# Patient Record
Sex: MALE | Race: WHITE | NOT HISPANIC OR LATINO | Employment: UNEMPLOYED | URBAN - METROPOLITAN AREA
[De-identification: names, ages, dates, MRNs, and addresses within clinical notes are randomized per-mention and may not be internally consistent; named-entity substitution may affect disease eponyms.]

---

## 2023-04-12 ENCOUNTER — TELEPHONE (OUTPATIENT)
Dept: PRIMARY CARE | Facility: CLINIC | Age: 58
End: 2023-04-12
Payer: MEDICAID

## 2023-04-12 DIAGNOSIS — R06.02 SOB (SHORTNESS OF BREATH) ON EXERTION: ICD-10-CM

## 2023-04-12 DIAGNOSIS — R29.898 WEAKNESS OF BOTH LOWER EXTREMITIES: ICD-10-CM

## 2023-04-12 DIAGNOSIS — I69.30 HISTORY OF STROKE WITH RESIDUAL EFFECTS: ICD-10-CM

## 2023-04-12 DIAGNOSIS — G89.4 CHRONIC PAIN SYNDROME: ICD-10-CM

## 2023-04-12 PROBLEM — M25.512 ACUTE PAIN OF BOTH SHOULDERS: Status: ACTIVE | Noted: 2023-04-12

## 2023-04-12 PROBLEM — M25.511 ACUTE PAIN OF BOTH SHOULDERS: Status: ACTIVE | Noted: 2023-04-12

## 2023-04-12 PROBLEM — R94.31 ABNORMAL ELECTROCARDIOGRAPHY: Status: ACTIVE | Noted: 2023-04-12

## 2023-04-12 PROBLEM — E66.01 MORBID OBESITY (MULTI): Status: ACTIVE | Noted: 2023-04-12

## 2023-04-12 PROBLEM — E78.5 HYPERLIPIDEMIA: Status: ACTIVE | Noted: 2023-04-12

## 2023-04-12 PROBLEM — S39.94XA TESTICULAR INJURY: Status: ACTIVE | Noted: 2023-04-12

## 2023-04-12 PROBLEM — R07.89 ATYPICAL CHEST PAIN: Status: ACTIVE | Noted: 2023-04-12

## 2023-04-12 PROBLEM — E11.65 TYPE 2 DIABETES MELLITUS WITH HYPERGLYCEMIA, WITHOUT LONG-TERM CURRENT USE OF INSULIN (MULTI): Status: ACTIVE | Noted: 2023-04-12

## 2023-04-12 PROBLEM — R09.89 CAROTID ARTERY BRUIT: Status: ACTIVE | Noted: 2023-04-12

## 2023-04-12 PROBLEM — G50.0 TRIGEMINAL NEURALGIA OF RIGHT SIDE OF FACE: Status: ACTIVE | Noted: 2023-04-12

## 2023-04-12 PROBLEM — M62.81 WEAKNESS OF TRUNK MUSCULATURE: Status: ACTIVE | Noted: 2023-04-12

## 2023-04-12 PROBLEM — I65.22 STENOSIS OF LEFT CAROTID ARTERY: Status: ACTIVE | Noted: 2023-04-12

## 2023-04-12 PROBLEM — R97.20 ELEVATED PROSTATE SPECIFIC ANTIGEN (PSA): Status: ACTIVE | Noted: 2023-04-12

## 2023-04-12 PROBLEM — N40.0 BPH (BENIGN PROSTATIC HYPERPLASIA): Status: ACTIVE | Noted: 2023-04-12

## 2023-04-12 PROBLEM — K21.9 GASTROESOPHAGEAL REFLUX DISEASE WITHOUT ESOPHAGITIS: Status: ACTIVE | Noted: 2023-04-12

## 2023-04-12 PROBLEM — D16.4 OSTEOMA OF SKULL: Status: ACTIVE | Noted: 2023-04-12

## 2023-04-12 PROBLEM — G89.29 INSOMNIA SECONDARY TO CHRONIC PAIN: Status: ACTIVE | Noted: 2023-04-12

## 2023-04-12 PROBLEM — H66.92 ACUTE LEFT OTITIS MEDIA: Status: ACTIVE | Noted: 2023-04-12

## 2023-04-12 PROBLEM — I73.9 PERIPHERAL VASCULAR DISEASE (CMS-HCC): Status: ACTIVE | Noted: 2023-04-12

## 2023-04-12 PROBLEM — I48.0 PAROXYSMAL ATRIAL FIBRILLATION (MULTI): Status: ACTIVE | Noted: 2023-04-12

## 2023-04-12 PROBLEM — I10 BENIGN ESSENTIAL HYPERTENSION: Status: ACTIVE | Noted: 2023-04-12

## 2023-04-12 PROBLEM — J01.90 ACUTE BACTERIAL SINUSITIS: Status: ACTIVE | Noted: 2023-04-12

## 2023-04-12 PROBLEM — B96.89 ACUTE BACTERIAL SINUSITIS: Status: ACTIVE | Noted: 2023-04-12

## 2023-04-12 PROBLEM — M54.2 ACUTE NECK PAIN: Status: ACTIVE | Noted: 2023-04-12

## 2023-04-12 PROBLEM — D35.00 ADRENAL ADENOMA: Status: ACTIVE | Noted: 2023-04-12

## 2023-04-12 PROBLEM — N35.919 URETHRAL STRICTURE: Status: ACTIVE | Noted: 2023-04-12

## 2023-04-12 PROBLEM — G47.01 INSOMNIA SECONDARY TO CHRONIC PAIN: Status: ACTIVE | Noted: 2023-04-12

## 2023-04-12 PROBLEM — R42 DIZZINESS: Status: ACTIVE | Noted: 2023-04-12

## 2023-04-12 PROBLEM — E55.9 VITAMIN D DEFICIENCY: Status: ACTIVE | Noted: 2023-04-12

## 2023-04-12 PROBLEM — I67.9 CEREBROVASCULAR SMALL VESSEL DISEASE: Status: ACTIVE | Noted: 2023-04-12

## 2023-04-12 RX ORDER — DEXLANSOPRAZOLE 30 MG/1
1 CAPSULE, DELAYED RELEASE ORAL DAILY
COMMUNITY

## 2023-04-12 RX ORDER — METOPROLOL SUCCINATE 25 MG/1
1 TABLET, EXTENDED RELEASE ORAL DAILY
COMMUNITY
Start: 2020-11-20 | End: 2023-07-31 | Stop reason: SINTOL

## 2023-04-12 RX ORDER — ASPIRIN 81 MG/1
1 TABLET ORAL DAILY
COMMUNITY
Start: 2023-02-02

## 2023-04-12 RX ORDER — METAXALONE 800 MG/1
800 TABLET ORAL 3 TIMES DAILY PRN
COMMUNITY
End: 2023-07-31 | Stop reason: ALTCHOICE

## 2023-04-12 RX ORDER — LANCETS 33 GAUGE
EACH MISCELLANEOUS
COMMUNITY
Start: 2023-01-24 | End: 2023-12-20 | Stop reason: SDUPTHER

## 2023-04-12 RX ORDER — ALBUTEROL SULFATE 90 UG/1
AEROSOL, METERED RESPIRATORY (INHALATION)
COMMUNITY
Start: 2023-01-12 | End: 2023-07-31 | Stop reason: ALTCHOICE

## 2023-04-12 RX ORDER — LORATADINE 10 MG/1
1 TABLET ORAL DAILY
COMMUNITY
Start: 2022-06-02

## 2023-04-12 RX ORDER — NIFEDIPINE 30 MG/1
TABLET, FILM COATED, EXTENDED RELEASE ORAL EVERY 12 HOURS
COMMUNITY
Start: 2017-12-15 | End: 2023-07-31 | Stop reason: SDUPTHER

## 2023-04-12 RX ORDER — BISOPROLOL FUMARATE 10 MG/1
1 TABLET, FILM COATED ORAL DAILY
COMMUNITY
Start: 2021-03-22 | End: 2023-10-23 | Stop reason: SDUPTHER

## 2023-04-12 RX ORDER — BLOOD SUGAR DIAGNOSTIC
1 STRIP MISCELLANEOUS 2 TIMES DAILY
COMMUNITY
Start: 2022-05-02 | End: 2024-02-07 | Stop reason: SDUPTHER

## 2023-04-12 RX ORDER — CHOLECALCIFEROL (VITAMIN D3) 125 MCG
CAPSULE ORAL
COMMUNITY
Start: 2018-02-02

## 2023-04-20 ENCOUNTER — TELEPHONE (OUTPATIENT)
Dept: PRIMARY CARE | Facility: CLINIC | Age: 58
End: 2023-04-20

## 2023-04-20 DIAGNOSIS — R21 RASH: Primary | ICD-10-CM

## 2023-04-21 RX ORDER — ISOPROPYL ALCOHOL 70 ML/100ML
SWAB TOPICAL
Qty: 100 EACH | Refills: 3 | Status: SHIPPED | OUTPATIENT
Start: 2023-04-21 | End: 2024-02-07 | Stop reason: SDUPTHER

## 2023-04-28 ENCOUNTER — LAB (OUTPATIENT)
Dept: LAB | Facility: LAB | Age: 58
End: 2023-04-28
Payer: MEDICAID

## 2023-04-28 ENCOUNTER — OFFICE VISIT (OUTPATIENT)
Dept: PRIMARY CARE | Facility: CLINIC | Age: 58
End: 2023-04-28
Payer: MEDICAID

## 2023-04-28 VITALS
DIASTOLIC BLOOD PRESSURE: 80 MMHG | HEART RATE: 109 BPM | WEIGHT: 253 LBS | BODY MASS INDEX: 35.42 KG/M2 | HEIGHT: 71 IN | SYSTOLIC BLOOD PRESSURE: 110 MMHG

## 2023-04-28 DIAGNOSIS — I73.9 PERIPHERAL VASCULAR DISEASE (CMS-HCC): ICD-10-CM

## 2023-04-28 DIAGNOSIS — R97.20 ELEVATED PROSTATE SPECIFIC ANTIGEN (PSA): ICD-10-CM

## 2023-04-28 DIAGNOSIS — H10.12 ALLERGIC CONJUNCTIVITIS AND RHINITIS, LEFT: ICD-10-CM

## 2023-04-28 DIAGNOSIS — I65.22 STENOSIS OF LEFT CAROTID ARTERY: ICD-10-CM

## 2023-04-28 DIAGNOSIS — I48.0 PAROXYSMAL ATRIAL FIBRILLATION (MULTI): Primary | ICD-10-CM

## 2023-04-28 DIAGNOSIS — E11.65 TYPE 2 DIABETES MELLITUS WITH HYPERGLYCEMIA, WITHOUT LONG-TERM CURRENT USE OF INSULIN (MULTI): ICD-10-CM

## 2023-04-28 DIAGNOSIS — E78.2 MIXED HYPERLIPIDEMIA: ICD-10-CM

## 2023-04-28 DIAGNOSIS — K21.9 GASTROESOPHAGEAL REFLUX DISEASE WITHOUT ESOPHAGITIS: ICD-10-CM

## 2023-04-28 DIAGNOSIS — L21.9 SEBORRHEIC DERMATITIS OF SCALP: ICD-10-CM

## 2023-04-28 DIAGNOSIS — I10 BENIGN ESSENTIAL HYPERTENSION: Primary | ICD-10-CM

## 2023-04-28 DIAGNOSIS — J30.9 ALLERGIC CONJUNCTIVITIS AND RHINITIS, LEFT: ICD-10-CM

## 2023-04-28 DIAGNOSIS — D35.01 ADENOMA OF RIGHT ADRENAL GLAND: ICD-10-CM

## 2023-04-28 DIAGNOSIS — E55.9 VITAMIN D DEFICIENCY: ICD-10-CM

## 2023-04-28 DIAGNOSIS — E66.01 CLASS 2 SEVERE OBESITY DUE TO EXCESS CALORIES WITH SERIOUS COMORBIDITY AND BODY MASS INDEX (BMI) OF 35.0 TO 35.9 IN ADULT (MULTI): ICD-10-CM

## 2023-04-28 DIAGNOSIS — I48.0 PAROXYSMAL ATRIAL FIBRILLATION (MULTI): ICD-10-CM

## 2023-04-28 PROBLEM — M25.512 ACUTE PAIN OF BOTH SHOULDERS: Status: RESOLVED | Noted: 2023-04-12 | Resolved: 2023-04-28

## 2023-04-28 PROBLEM — G89.4 CHRONIC PAIN SYNDROME: Status: RESOLVED | Noted: 2023-04-12 | Resolved: 2023-04-28

## 2023-04-28 PROBLEM — B96.89 ACUTE BACTERIAL SINUSITIS: Status: RESOLVED | Noted: 2023-04-12 | Resolved: 2023-04-28

## 2023-04-28 PROBLEM — R06.02 SOB (SHORTNESS OF BREATH) ON EXERTION: Status: RESOLVED | Noted: 2023-04-12 | Resolved: 2023-04-28

## 2023-04-28 PROBLEM — H66.92 ACUTE LEFT OTITIS MEDIA: Status: RESOLVED | Noted: 2023-04-12 | Resolved: 2023-04-28

## 2023-04-28 PROBLEM — G89.29 INSOMNIA SECONDARY TO CHRONIC PAIN: Status: RESOLVED | Noted: 2023-04-12 | Resolved: 2023-04-28

## 2023-04-28 PROBLEM — J01.90 ACUTE BACTERIAL SINUSITIS: Status: RESOLVED | Noted: 2023-04-12 | Resolved: 2023-04-28

## 2023-04-28 PROBLEM — R29.898 WEAKNESS OF BOTH LOWER EXTREMITIES: Status: RESOLVED | Noted: 2023-04-12 | Resolved: 2023-04-28

## 2023-04-28 PROBLEM — R07.89 ATYPICAL CHEST PAIN: Status: RESOLVED | Noted: 2023-04-12 | Resolved: 2023-04-28

## 2023-04-28 PROBLEM — E66.812 CLASS 2 SEVERE OBESITY DUE TO EXCESS CALORIES WITH SERIOUS COMORBIDITY AND BODY MASS INDEX (BMI) OF 35.0 TO 35.9 IN ADULT: Status: ACTIVE | Noted: 2023-04-12

## 2023-04-28 PROBLEM — G50.0 TRIGEMINAL NEURALGIA OF RIGHT SIDE OF FACE: Status: RESOLVED | Noted: 2023-04-12 | Resolved: 2023-04-28

## 2023-04-28 PROBLEM — R94.31 ABNORMAL ELECTROCARDIOGRAPHY: Status: RESOLVED | Noted: 2023-04-12 | Resolved: 2023-04-28

## 2023-04-28 PROBLEM — J01.01 ACUTE RECURRENT MAXILLARY SINUSITIS: Status: ACTIVE | Noted: 2023-04-28

## 2023-04-28 PROBLEM — M25.511 ACUTE PAIN OF BOTH SHOULDERS: Status: RESOLVED | Noted: 2023-04-12 | Resolved: 2023-04-28

## 2023-04-28 PROBLEM — G47.01 INSOMNIA SECONDARY TO CHRONIC PAIN: Status: RESOLVED | Noted: 2023-04-12 | Resolved: 2023-04-28

## 2023-04-28 PROBLEM — R09.89 CAROTID ARTERY BRUIT: Status: RESOLVED | Noted: 2023-04-12 | Resolved: 2023-04-28

## 2023-04-28 PROBLEM — R42 DIZZINESS: Status: RESOLVED | Noted: 2023-04-12 | Resolved: 2023-04-28

## 2023-04-28 PROBLEM — M54.2 ACUTE NECK PAIN: Status: RESOLVED | Noted: 2023-04-12 | Resolved: 2023-04-28

## 2023-04-28 LAB
ALANINE AMINOTRANSFERASE (SGPT) (U/L) IN SER/PLAS: 23 U/L (ref 10–52)
ALBUMIN (G/DL) IN SER/PLAS: 4.2 G/DL (ref 3.4–5)
ALBUMIN (MG/L) IN URINE: 386.1 MG/L
ALBUMIN/CREATININE (UG/MG) IN URINE: 321.8 UG/MG CRT (ref 0–30)
ALKALINE PHOSPHATASE (U/L) IN SER/PLAS: 83 U/L (ref 33–120)
ANION GAP IN SER/PLAS: 14 MMOL/L (ref 10–20)
ASPARTATE AMINOTRANSFERASE (SGOT) (U/L) IN SER/PLAS: 13 U/L (ref 9–39)
BILIRUBIN TOTAL (MG/DL) IN SER/PLAS: 0.5 MG/DL (ref 0–1.2)
CALCIDIOL (25 OH VITAMIN D3) (NG/ML) IN SER/PLAS: 18 NG/ML
CALCIUM (MG/DL) IN SER/PLAS: 9.6 MG/DL (ref 8.6–10.3)
CARBON DIOXIDE, TOTAL (MMOL/L) IN SER/PLAS: 23 MMOL/L (ref 21–32)
CHLORIDE (MMOL/L) IN SER/PLAS: 103 MMOL/L (ref 98–107)
CHOLESTEROL (MG/DL) IN SER/PLAS: 266 MG/DL (ref 0–199)
CHOLESTEROL IN HDL (MG/DL) IN SER/PLAS: 44.9 MG/DL
CHOLESTEROL/HDL RATIO: 5.9
CREATININE (MG/DL) IN SER/PLAS: 0.85 MG/DL (ref 0.5–1.3)
CREATININE (MG/DL) IN URINE: 120 MG/DL (ref 20–370)
GFR MALE: >90 ML/MIN/1.73M2
GLUCOSE (MG/DL) IN SER/PLAS: 146 MG/DL (ref 74–99)
LDL: 156 MG/DL (ref 0–99)
NON HDL CHOLESTEROL: 221 MG/DL
POTASSIUM (MMOL/L) IN SER/PLAS: 4.4 MMOL/L (ref 3.5–5.3)
PROSTATE SPECIFIC AG (NG/ML) IN SER/PLAS: 7.34 NG/ML (ref 0–4)
PROTEIN TOTAL: 6.9 G/DL (ref 6.4–8.2)
SODIUM (MMOL/L) IN SER/PLAS: 136 MMOL/L (ref 136–145)
TRIGLYCERIDE (MG/DL) IN SER/PLAS: 325 MG/DL (ref 0–149)
UREA NITROGEN (MG/DL) IN SER/PLAS: 16 MG/DL (ref 6–23)
VLDL: 65 MG/DL (ref 0–40)

## 2023-04-28 PROCEDURE — 83036 HEMOGLOBIN GLYCOSYLATED A1C: CPT

## 2023-04-28 PROCEDURE — 80053 COMPREHEN METABOLIC PANEL: CPT

## 2023-04-28 PROCEDURE — 3079F DIAST BP 80-89 MM HG: CPT | Performed by: INTERNAL MEDICINE

## 2023-04-28 PROCEDURE — 80061 LIPID PANEL: CPT

## 2023-04-28 PROCEDURE — 82043 UR ALBUMIN QUANTITATIVE: CPT

## 2023-04-28 PROCEDURE — 99214 OFFICE O/P EST MOD 30 MIN: CPT | Performed by: INTERNAL MEDICINE

## 2023-04-28 PROCEDURE — 3008F BODY MASS INDEX DOCD: CPT | Performed by: INTERNAL MEDICINE

## 2023-04-28 PROCEDURE — 82570 ASSAY OF URINE CREATININE: CPT

## 2023-04-28 PROCEDURE — 36415 COLL VENOUS BLD VENIPUNCTURE: CPT

## 2023-04-28 PROCEDURE — 82306 VITAMIN D 25 HYDROXY: CPT

## 2023-04-28 PROCEDURE — 3074F SYST BP LT 130 MM HG: CPT | Performed by: INTERNAL MEDICINE

## 2023-04-28 PROCEDURE — 1036F TOBACCO NON-USER: CPT | Performed by: INTERNAL MEDICINE

## 2023-04-28 RX ORDER — ACETAMINOPHEN 500 MG
1 TABLET ORAL AS NEEDED
COMMUNITY
End: 2023-04-28 | Stop reason: SDUPTHER

## 2023-04-28 RX ORDER — OLOPATADINE HYDROCHLORIDE 1 MG/ML
1 SOLUTION/ DROPS OPHTHALMIC 2 TIMES DAILY PRN
Qty: 5 ML | Refills: 1 | Status: SHIPPED | OUTPATIENT
Start: 2023-04-28 | End: 2023-08-26

## 2023-04-28 RX ORDER — KETOCONAZOLE 20 MG/ML
SHAMPOO, SUSPENSION TOPICAL 2 TIMES WEEKLY
Qty: 120 ML | Refills: 0 | Status: SHIPPED | OUTPATIENT
Start: 2023-05-01 | End: 2023-07-31 | Stop reason: SDUPTHER

## 2023-04-28 RX ORDER — ACETAMINOPHEN 500 MG
1 TABLET ORAL AS NEEDED
Qty: 1 EACH | Refills: 0 | Status: SHIPPED | OUTPATIENT
Start: 2023-04-28 | End: 2023-05-03 | Stop reason: SDUPTHER

## 2023-04-28 RX ORDER — AZITHROMYCIN 250 MG/1
TABLET, FILM COATED ORAL
Qty: 6 TABLET | Refills: 0 | Status: SHIPPED | OUTPATIENT
Start: 2023-04-28 | End: 2023-05-03

## 2023-04-28 ASSESSMENT — ENCOUNTER SYMPTOMS
SINUS PRESSURE: 1
LOSS OF SENSATION IN FEET: 0
OCCASIONAL FEELINGS OF UNSTEADINESS: 0
HEADACHES: 1
DEPRESSION: 0

## 2023-04-28 ASSESSMENT — COLUMBIA-SUICIDE SEVERITY RATING SCALE - C-SSRS
6. HAVE YOU EVER DONE ANYTHING, STARTED TO DO ANYTHING, OR PREPARED TO DO ANYTHING TO END YOUR LIFE?: NO
1. IN THE PAST MONTH, HAVE YOU WISHED YOU WERE DEAD OR WISHED YOU COULD GO TO SLEEP AND NOT WAKE UP?: NO
2. HAVE YOU ACTUALLY HAD ANY THOUGHTS OF KILLING YOURSELF?: NO

## 2023-04-28 ASSESSMENT — PATIENT HEALTH QUESTIONNAIRE - PHQ9
SUM OF ALL RESPONSES TO PHQ9 QUESTIONS 1 AND 2: 0
2. FEELING DOWN, DEPRESSED OR HOPELESS: NOT AT ALL
1. LITTLE INTEREST OR PLEASURE IN DOING THINGS: NOT AT ALL

## 2023-04-28 NOTE — ASSESSMENT & PLAN NOTE
PSA elevated 7.25 increasing from baseline of 4 follows with urologist has repeat PSA to determine biopsy

## 2023-04-28 NOTE — ASSESSMENT & PLAN NOTE
Last hemoglobin A1c was 6.4 diet controlled at this timeReevaluate with next blood work in 3 months

## 2023-04-28 NOTE — ASSESSMENT & PLAN NOTE
Patanol 0.2% eyedrop to be used on left eye for allergic conjunctivitis continue with antihistamines

## 2023-04-28 NOTE — ASSESSMENT & PLAN NOTE
Continue to aggressively work on risk factor management continue aspirin 81 mg daily optimize LDL cholesterol less than 50

## 2023-04-28 NOTE — ASSESSMENT & PLAN NOTE
Stressed importance of lifestyle changes reducing foods with high caloric intake and processed foods fast foods reevaluate with next visit

## 2023-04-28 NOTE — ASSESSMENT & PLAN NOTE
Patient intolerant of statins reevaluate for PSK 9 inhibitor therapy for LDL goal less than 70 in the setting of previous stroke and established vascular disease

## 2023-04-28 NOTE — ASSESSMENT & PLAN NOTE
Left internal carotid recent carotid Doppler 50 to 69% MRA revealed minimal disease not a candidate at this time for carotid endarterectomy continue to monitor with vascular surgery on a yearly basis completed in February 2023

## 2023-04-28 NOTE — PROGRESS NOTES
"Subjective   Reason for Visit: Joey Jean is an 58 y.o. male here for a Medicare Wellness visit.     Past Medical, Surgical, and Family History reviewed and updated in chart.    Reviewed all medications by prescribing practitioner or clinical pharmacist (such as prescriptions, OTCs, herbal therapies and supplements) and documented in the medical record.    Sinusitis  This is a recurrent problem. The current episode started 1 to 4 weeks ago. The problem has been gradually worsening since onset. There has been no fever. His pain is at a severity of 5/10. The pain is mild. Associated symptoms include congestion, headaches and sinus pressure. Past treatments include nasal decongestants. The treatment provided mild relief.       Patient Care Team:  Hugo Garzon DO as PCP - General     Review of Systems   HENT:  Positive for congestion and sinus pressure.    Neurological:  Positive for headaches.   All other systems reviewed and are negative.      Objective   Vitals:  /80 (BP Location: Right arm, Patient Position: Sitting)   Pulse 109   Ht 1.791 m (5' 10.5\")   Wt 115 kg (253 lb)   BMI 35.79 kg/m²       Physical Exam  Vitals and nursing note reviewed.   Constitutional:       General: He is not in acute distress.     Appearance: Normal appearance. He is well-developed. He is not toxic-appearing.   HENT:      Head: Normocephalic and atraumatic.      Right Ear: Tympanic membrane and external ear normal.      Left Ear: Tympanic membrane and external ear normal.      Nose: Nose normal.      Mouth/Throat:      Mouth: Mucous membranes are moist.      Pharynx: Oropharynx is clear. No oropharyngeal exudate or posterior oropharyngeal erythema.      Tonsils: No tonsillar exudate. 2+ on the right. 2+ on the left.   Eyes:      Extraocular Movements: Extraocular movements intact.      Conjunctiva/sclera: Conjunctivae normal.   Cardiovascular:      Rate and Rhythm: Normal rate and regular rhythm.      Pulses: " Normal pulses.      Heart sounds: Normal heart sounds. No murmur heard.  Pulmonary:      Effort: Pulmonary effort is normal.      Breath sounds: Normal breath sounds.   Abdominal:      General: Abdomen is flat. Bowel sounds are normal.      Palpations: Abdomen is soft.   Musculoskeletal:      Cervical back: Neck supple.   Feet:      Right foot:      Skin integrity: Skin integrity normal. No ulcer, blister, skin breakdown, erythema, warmth or callus.      Toenail Condition: Right toenails are normal.      Left foot:      Skin integrity: Skin integrity normal. No ulcer, blister, skin breakdown, erythema, warmth or callus.      Toenail Condition: Left toenails are normal.   Lymphadenopathy:      Cervical: No cervical adenopathy.   Skin:     General: Skin is warm and dry.      Capillary Refill: Capillary refill takes more than 3 seconds.      Findings: No rash.   Neurological:      Mental Status: He is alert. Mental status is at baseline.      Sensory: Sensation is intact.   Psychiatric:         Mood and Affect: Mood normal.         Behavior: Behavior normal.         Thought Content: Thought content normal.         Judgment: Judgment normal.         Assessment/Plan   Problem List Items Addressed This Visit          Circulatory    Paroxysmal atrial fibrillation (CMS/HCC) - Primary    Current Assessment & Plan     Rate controlled on metoprolol succinate ER 24 mg daily and nifedipine 30 mg every 12 hours         Relevant Medications    olopatadine (Patanol) 0.1 % ophthalmic solution    ketoconazole (NIZOral) 2 % shampoo (Start on 5/1/2023)    azithromycin (Zithromax) 250 mg tablet    Other Relevant Orders    Comprehensive Metabolic Panel    Hemoglobin A1C    Lipid Panel    Albumin , Urine Random    Follow Up In [facility] - PCP    Peripheral vascular disease (CMS/HCC)    Current Assessment & Plan     Continue to aggressively work on risk factor management continue aspirin 81 mg daily optimize LDL cholesterol  less than 50         Relevant Medications    olopatadine (Patanol) 0.1 % ophthalmic solution    ketoconazole (NIZOral) 2 % shampoo (Start on 5/1/2023)    azithromycin (Zithromax) 250 mg tablet    Other Relevant Orders    Comprehensive Metabolic Panel    Hemoglobin A1C    Lipid Panel    Albumin , Urine Random    Follow Up [facility] - PCP    Stenosis of left carotid artery    Current Assessment & Plan     Left internal carotid recent carotid Doppler 50 to 69% MRA revealed minimal disease not a candidate at this time for carotid endarterectomy continue to monitor with vascular surgery on a yearly basis completed in February 2023         Relevant Medications    olopatadine (Patanol) 0.1 % ophthalmic solution    ketoconazole (NIZOral) 2 % shampoo (Start on 5/1/2023)    azithromycin (Zithromax) 250 mg tablet    Other Relevant Orders    Comprehensive Metabolic Panel    Hemoglobin A1C    Lipid Panel    Albumin , Urine Random    Follow Up [facility] - PCP       Digestive    Adrenal adenoma    Current Assessment & Plan     Unchanged 1.7 cm right adrenal adenoma without activity noted on CAT scan 2021 stable         Relevant Medications    olopatadine (Patanol) 0.1 % ophthalmic solution    ketoconazole (NIZOral) 2 % shampoo (Start on 5/1/2023)    azithromycin (Zithromax) 250 mg tablet    Other Relevant Orders    Comprehensive Metabolic Panel    Hemoglobin A1C    Lipid Panel    Albumin , Urine Random    Follow Up [facility] - PCP    Gastroesophageal reflux disease without esophagitis    Current Assessment & Plan     Continue Dexilant 30 mg 1 capsule daily stable         Relevant Medications    olopatadine (Patanol) 0.1 % ophthalmic solution    ketoconazole (NIZOral) 2 % shampoo (Start on 5/1/2023)    azithromycin (Zithromax) 250 mg tablet    Other Relevant Orders    Comprehensive Metabolic Panel    Hemoglobin A1C    Lipid Panel    Albumin , Urine Random    Follow Up [facility] - PCP       Genitourinary    Elevated prostate specific antigen (PSA)    Current Assessment & Plan     PSA elevated 7.25 increasing from baseline of 4 follows with urologist has repeat PSA to determine biopsy         Relevant Medications    olopatadine (Patanol) 0.1 % ophthalmic solution    ketoconazole (NIZOral) 2 % shampoo (Start on 5/1/2023)    azithromycin (Zithromax) 250 mg tablet    Other Relevant Orders    Comprehensive Metabolic Panel    Hemoglobin A1C    Lipid Panel    Albumin , Urine Random       Endocrine/Metabolic    Class 2 severe obesity due to excess calories with serious comorbidity and body mass index (BMI) of 35.0 to 35.9 in adult (CMS/[facility])    Current Assessment & Plan     Stressed importance of lifestyle changes reducing foods with high caloric intake and processed foods fast foods reevaluate with next visit         Type 2 diabetes mellitus with hyperglycemia, without long-term current use of insulin (CMS/HCC)    Current Assessment & Plan     Last hemoglobin A1c was 6.4 diet controlled at this timeReevaluate with next blood work in 3 months         Relevant Orders    Follow Up In [facility] - PCP    Vitamin D deficiency    Current Assessment & Plan     check vitamin D levels with next visit         Relevant Orders    Follow Up In [facility] - PCP    Vitamin D 25-Hydroxy,Total       Infectious/Inflammatory    Seborrheic dermatitis of scalp    Current Assessment & Plan     Placed on ketoconazole shampoo to be used twice a week as directed reevaluate next visit            Other    Hyperlipidemia    Current Assessment & Plan     Patient intolerant of statins reevaluate for PSK 9 inhibitor therapy for LDL goal less than 70 in the setting of previous stroke and established vascular disease         Allergic conjunctivitis and rhinitis, left    Current Assessment & Plan     Patanol 0.2% eyedrop to be used on left eye for allergic conjunctivitis continue with antihistamines          Relevant Medications    olopatadine (Patanol) 0.1 % ophthalmic solution    ketoconazole (NIZOral) 2 % shampoo (Start on 5/1/2023)    azithromycin (Zithromax) 250 mg tablet    Other Relevant Orders    Comprehensive Metabolic Panel    Hemoglobin A1C    Lipid Panel    Albumin , Urine Random    Follow Up In [facility] - PCP

## 2023-04-28 NOTE — TELEPHONE ENCOUNTER
[facility] told patient that the Patanol eye solution was not covered and patient wondering if you could send something else  Please call [phone]

## 2023-04-28 NOTE — ASSESSMENT & PLAN NOTE
>>ASSESSMENT AND PLAN FOR ADRENAL ADENOMA WRITTEN ON 4/28/2023  2:25 PM BY PAU WOLF, DO    Unchanged 1.7 cm right adrenal adenoma without activity noted on CAT scan 2021 stable

## 2023-04-29 LAB
ESTIMATED AVERAGE GLUCOSE FOR HBA1C: 151 MG/DL
HEMOGLOBIN A1C/HEMOGLOBIN TOTAL IN BLOOD: 6.9 %

## 2023-05-01 ENCOUNTER — TELEPHONE (OUTPATIENT)
Dept: PRIMARY CARE | Facility: CLINIC | Age: 58
End: 2023-05-01

## 2023-05-01 DIAGNOSIS — I10 HYPERTENSION, UNSPECIFIED TYPE: ICD-10-CM

## 2023-05-01 PROBLEM — G89.29 INSOMNIA SECONDARY TO CHRONIC PAIN: Status: ACTIVE | Noted: 2023-05-01

## 2023-05-01 PROBLEM — I73.9 PERIPHERAL VASCULAR DISEASE, UNSPECIFIED (CMS-HCC): Status: ACTIVE | Noted: 2023-05-01

## 2023-05-01 PROBLEM — G89.4 CHRONIC PAIN SYNDROME: Status: ACTIVE | Noted: 2023-05-01

## 2023-05-01 PROBLEM — I48.0 PAROXYSMAL A-FIB (MULTI): Status: ACTIVE | Noted: 2023-05-01

## 2023-05-01 PROBLEM — R06.02 SOB (SHORTNESS OF BREATH) ON EXERTION: Status: ACTIVE | Noted: 2023-05-01

## 2023-05-01 PROBLEM — R94.31 ABNORMAL ELECTROCARDIOGRAPHY: Status: ACTIVE | Noted: 2023-05-01

## 2023-05-01 PROBLEM — E78.5 HYPERLIPIDEMIA: Status: ACTIVE | Noted: 2023-05-01

## 2023-05-01 PROBLEM — D16.4 OSTEOMA OF SKULL: Status: ACTIVE | Noted: 2023-05-01

## 2023-05-01 PROBLEM — K21.9 GERD (GASTROESOPHAGEAL REFLUX DISEASE): Status: ACTIVE | Noted: 2023-05-01

## 2023-05-01 PROBLEM — G47.01 INSOMNIA SECONDARY TO CHRONIC PAIN: Status: ACTIVE | Noted: 2023-05-01

## 2023-05-01 PROBLEM — E11.9 TYPE 2 DIABETES MELLITUS (MULTI): Status: ACTIVE | Noted: 2023-05-01

## 2023-05-01 PROBLEM — N40.0 BPH (BENIGN PROSTATIC HYPERPLASIA): Status: ACTIVE | Noted: 2023-05-01

## 2023-05-01 PROBLEM — R97.20 ELEVATED PROSTATE SPECIFIC ANTIGEN (PSA): Status: ACTIVE | Noted: 2023-05-01

## 2023-05-01 PROBLEM — I65.22 STENOSIS OF LEFT CAROTID ARTERY: Status: ACTIVE | Noted: 2023-05-01

## 2023-05-01 PROBLEM — R07.89 ATYPICAL CHEST PAIN: Status: ACTIVE | Noted: 2023-05-01

## 2023-05-01 PROBLEM — G50.0 TRIGEMINAL NEURALGIA: Status: ACTIVE | Noted: 2023-05-01

## 2023-05-01 PROBLEM — E55.9 VITAMIN D DEFICIENCY: Status: ACTIVE | Noted: 2023-05-01

## 2023-05-01 PROBLEM — I67.9 CEREBROVASCULAR SMALL VESSEL DISEASE: Status: ACTIVE | Noted: 2023-05-01

## 2023-05-01 RX ORDER — VITAMIN A 3000 MCG
2 CAPSULE ORAL 2 TIMES DAILY
COMMUNITY
Start: 2023-03-01

## 2023-05-01 RX ORDER — BLOOD SUGAR DIAGNOSTIC
STRIP MISCELLANEOUS 2 TIMES DAILY
COMMUNITY
Start: 2023-04-20 | End: 2023-07-31 | Stop reason: SDUPTHER

## 2023-05-01 RX ORDER — BISOPROLOL FUMARATE 10 MG/1
1 TABLET, FILM COATED ORAL DAILY
COMMUNITY
Start: 2023-02-16 | End: 2023-05-12 | Stop reason: SDUPTHER

## 2023-05-01 RX ORDER — NIFEDIPINE 30 MG/1
1 TABLET, FILM COATED, EXTENDED RELEASE ORAL 2 TIMES DAILY
COMMUNITY
Start: 2023-03-07 | End: 2024-03-06 | Stop reason: SDUPTHER

## 2023-05-01 RX ORDER — KETOCONAZOLE 20 MG/ML
SHAMPOO, SUSPENSION TOPICAL
COMMUNITY
Start: 2023-04-28 | End: 2023-07-31 | Stop reason: SDUPTHER

## 2023-05-01 RX ORDER — LANCETS 30 GAUGE
EACH MISCELLANEOUS
COMMUNITY
Start: 2022-12-02

## 2023-05-01 RX ORDER — ALBUTEROL SULFATE 90 UG/1
2 AEROSOL, METERED RESPIRATORY (INHALATION)
COMMUNITY
Start: 2023-01-12 | End: 2023-07-31 | Stop reason: SDUPTHER

## 2023-05-01 RX ORDER — OLOPATADINE HYDROCHLORIDE 1 MG/ML
SOLUTION/ DROPS OPHTHALMIC
COMMUNITY
Start: 2023-04-28 | End: 2023-07-31 | Stop reason: SDUPTHER

## 2023-05-01 RX ORDER — DEXLANSOPRAZOLE 30 MG/1
1 CAPSULE, DELAYED RELEASE ORAL DAILY
COMMUNITY
Start: 2023-04-11 | End: 2023-07-31 | Stop reason: SDUPTHER

## 2023-05-01 RX ORDER — LORATADINE 10 MG/1
1 TABLET ORAL DAILY
COMMUNITY
Start: 2022-06-02 | End: 2023-07-31 | Stop reason: SDUPTHER

## 2023-05-01 RX ORDER — METAXALONE 800 MG/1
1 TABLET ORAL EVERY 8 HOURS PRN
COMMUNITY
Start: 2023-04-11 | End: 2023-07-31 | Stop reason: SDUPTHER

## 2023-05-01 RX ORDER — OXYCODONE HYDROCHLORIDE 15 MG/1
15 TABLET, FILM COATED, EXTENDED RELEASE ORAL EVERY 12 HOURS
COMMUNITY
Start: 2023-04-22

## 2023-05-01 RX ORDER — ACETAMINOPHEN 500 MG
TABLET ORAL
Qty: 1 EACH | Refills: 0 | Status: SHIPPED | OUTPATIENT
Start: 2023-05-01 | End: 2023-07-31 | Stop reason: ALTCHOICE

## 2023-05-01 RX ORDER — FLUOROMETHOLONE 1 MG/ML
SUSPENSION/ DROPS OPHTHALMIC
COMMUNITY
Start: 2022-05-19

## 2023-05-01 NOTE — TELEPHONE ENCOUNTER
Asking that blood pressure monitor be sent to [facility] pharmacy  (he would like one that shows irregular heartbeats)

## 2023-05-03 ENCOUNTER — TELEPHONE (OUTPATIENT)
Dept: PRIMARY CARE | Facility: CLINIC | Age: 58
End: 2023-05-03
Payer: MEDICAID

## 2023-05-03 DIAGNOSIS — I10 BENIGN ESSENTIAL HYPERTENSION: ICD-10-CM

## 2023-05-03 RX ORDER — ACETAMINOPHEN 500 MG
1 TABLET ORAL AS NEEDED
Qty: 1 EACH | Refills: 0 | Status: SHIPPED | OUTPATIENT
Start: 2023-05-03 | End: 2023-07-31 | Stop reason: ALTCHOICE

## 2023-05-03 NOTE — TELEPHONE ENCOUNTER
Blood pressure test kit large was sent to [facility]. Patient said insurance wont cover there and needs sent to  Pharmacy. Thanks!!

## 2023-05-11 ENCOUNTER — TELEPHONE (OUTPATIENT)
Dept: PRIMARY CARE | Facility: CLINIC | Age: 58
End: 2023-05-11

## 2023-05-12 ENCOUNTER — TELEPHONE (OUTPATIENT)
Dept: PRIMARY CARE | Facility: CLINIC | Age: 58
End: 2023-05-12

## 2023-05-12 DIAGNOSIS — I10 PRIMARY HYPERTENSION: Primary | ICD-10-CM

## 2023-05-12 RX ORDER — BISOPROLOL FUMARATE 10 MG/1
10 TABLET, FILM COATED ORAL DAILY
Qty: 90 TABLET | Refills: 3 | Status: SHIPPED | OUTPATIENT
Start: 2023-05-12 | End: 2023-07-31 | Stop reason: SDUPTHER

## 2023-05-25 ENCOUNTER — TELEPHONE (OUTPATIENT)
Dept: PRIMARY CARE | Facility: CLINIC | Age: 58
End: 2023-05-25
Payer: MEDICAID

## 2023-07-07 ENCOUNTER — TELEPHONE (OUTPATIENT)
Dept: PRIMARY CARE | Facility: CLINIC | Age: 58
End: 2023-07-07
Payer: MEDICAID

## 2023-07-31 ENCOUNTER — OFFICE VISIT (OUTPATIENT)
Dept: PRIMARY CARE | Facility: CLINIC | Age: 58
End: 2023-07-31
Payer: MEDICAID

## 2023-07-31 VITALS
WEIGHT: 232 LBS | DIASTOLIC BLOOD PRESSURE: 84 MMHG | BODY MASS INDEX: 32.48 KG/M2 | HEIGHT: 71 IN | SYSTOLIC BLOOD PRESSURE: 130 MMHG | HEART RATE: 74 BPM

## 2023-07-31 DIAGNOSIS — I73.9 PERIPHERAL VASCULAR DISEASE (CMS-HCC): ICD-10-CM

## 2023-07-31 DIAGNOSIS — L21.9 SEBORRHEIC DERMATITIS OF SCALP: ICD-10-CM

## 2023-07-31 DIAGNOSIS — E55.9 VITAMIN D DEFICIENCY: ICD-10-CM

## 2023-07-31 DIAGNOSIS — I65.22 STENOSIS OF LEFT CAROTID ARTERY: ICD-10-CM

## 2023-07-31 DIAGNOSIS — K21.9 GASTROESOPHAGEAL REFLUX DISEASE WITHOUT ESOPHAGITIS: ICD-10-CM

## 2023-07-31 DIAGNOSIS — R97.20 ELEVATED PROSTATE SPECIFIC ANTIGEN (PSA): ICD-10-CM

## 2023-07-31 DIAGNOSIS — E11.65 TYPE 2 DIABETES MELLITUS WITH HYPERGLYCEMIA, WITHOUT LONG-TERM CURRENT USE OF INSULIN (MULTI): Primary | ICD-10-CM

## 2023-07-31 DIAGNOSIS — E66.09 CLASS 1 OBESITY DUE TO EXCESS CALORIES WITH SERIOUS COMORBIDITY AND BODY MASS INDEX (BMI) OF 32.0 TO 32.9 IN ADULT: ICD-10-CM

## 2023-07-31 DIAGNOSIS — J30.9 ALLERGIC CONJUNCTIVITIS AND RHINITIS, LEFT: ICD-10-CM

## 2023-07-31 DIAGNOSIS — I10 BENIGN ESSENTIAL HYPERTENSION: ICD-10-CM

## 2023-07-31 DIAGNOSIS — I48.0 PAROXYSMAL ATRIAL FIBRILLATION (MULTI): ICD-10-CM

## 2023-07-31 DIAGNOSIS — H10.12 ALLERGIC CONJUNCTIVITIS AND RHINITIS, LEFT: ICD-10-CM

## 2023-07-31 DIAGNOSIS — E78.2 MIXED HYPERLIPIDEMIA: ICD-10-CM

## 2023-07-31 DIAGNOSIS — D35.01 ADENOMA OF RIGHT ADRENAL GLAND: ICD-10-CM

## 2023-07-31 PROBLEM — E78.5 DYSLIPIDEMIA: Status: RESOLVED | Noted: 2023-05-01 | Resolved: 2023-07-31

## 2023-07-31 PROBLEM — E66.811 CLASS 1 OBESITY DUE TO EXCESS CALORIES WITH SERIOUS COMORBIDITY AND BODY MASS INDEX (BMI) OF 32.0 TO 32.9 IN ADULT: Status: ACTIVE | Noted: 2023-04-12

## 2023-07-31 PROBLEM — D16.4 OSTEOMA OF SKULL: Status: RESOLVED | Noted: 2023-04-12 | Resolved: 2023-07-31

## 2023-07-31 PROBLEM — R06.02 SOB (SHORTNESS OF BREATH) ON EXERTION: Status: RESOLVED | Noted: 2023-05-01 | Resolved: 2023-07-31

## 2023-07-31 PROBLEM — E11.9 TYPE 2 DIABETES MELLITUS (MULTI): Status: RESOLVED | Noted: 2023-05-01 | Resolved: 2023-07-31

## 2023-07-31 PROBLEM — S39.94XA TESTICULAR INJURY: Status: RESOLVED | Noted: 2023-04-12 | Resolved: 2023-07-31

## 2023-07-31 PROBLEM — J01.01 ACUTE RECURRENT MAXILLARY SINUSITIS: Status: RESOLVED | Noted: 2023-04-28 | Resolved: 2023-07-31

## 2023-07-31 PROBLEM — R07.89 ATYPICAL CHEST PAIN: Status: RESOLVED | Noted: 2023-05-01 | Resolved: 2023-07-31

## 2023-07-31 PROBLEM — I67.9 CEREBROVASCULAR SMALL VESSEL DISEASE: Status: RESOLVED | Noted: 2023-04-12 | Resolved: 2023-07-31

## 2023-07-31 PROBLEM — M62.81 WEAKNESS OF TRUNK MUSCULATURE: Status: RESOLVED | Noted: 2023-04-12 | Resolved: 2023-07-31

## 2023-07-31 PROBLEM — R94.31 ABNORMAL ELECTROCARDIOGRAPHY: Status: RESOLVED | Noted: 2023-05-01 | Resolved: 2023-07-31

## 2023-07-31 PROBLEM — N40.0 BPH (BENIGN PROSTATIC HYPERPLASIA): Status: RESOLVED | Noted: 2023-04-12 | Resolved: 2023-07-31

## 2023-07-31 PROCEDURE — 3044F HG A1C LEVEL LT 7.0%: CPT | Performed by: INTERNAL MEDICINE

## 2023-07-31 PROCEDURE — 99213 OFFICE O/P EST LOW 20 MIN: CPT | Performed by: INTERNAL MEDICINE

## 2023-07-31 PROCEDURE — 3008F BODY MASS INDEX DOCD: CPT | Performed by: INTERNAL MEDICINE

## 2023-07-31 PROCEDURE — 3079F DIAST BP 80-89 MM HG: CPT | Performed by: INTERNAL MEDICINE

## 2023-07-31 PROCEDURE — 3075F SYST BP GE 130 - 139MM HG: CPT | Performed by: INTERNAL MEDICINE

## 2023-07-31 PROCEDURE — 1036F TOBACCO NON-USER: CPT | Performed by: INTERNAL MEDICINE

## 2023-07-31 RX ORDER — KETOCONAZOLE 20 MG/ML
SHAMPOO, SUSPENSION TOPICAL
Qty: 200 ML | Refills: 11 | Status: SHIPPED | OUTPATIENT
Start: 2023-07-31

## 2023-07-31 RX ORDER — CYCLOBENZAPRINE HCL 10 MG
10 TABLET ORAL 3 TIMES DAILY PRN
COMMUNITY

## 2023-07-31 NOTE — PROGRESS NOTES
"Subjective   Reason for Visit: Joey Jean is an 58 y.o. male here for a fu visit.     Past Medical, Surgical, and Family History reviewed and updated in chart.    Reviewed all medications by prescribing practitioner or clinical pharmacist (such as prescriptions, OTCs, herbal therapies and supplements) and documented in the medical record.    No new acute complaints needs refill on shampoo for seborrheic dermatitis.  Patient continues to have chest wall pain from muscular accident atrial fibrillation is well controlled on medication blood pressures controlled patient has done well with treatment of morbid obesity with improvement in BMI planning to move out of the area within the next 3 to [place] chronic [place] pain        Patient Care Team:  Hugo Garzon DO as PCP - General (Internal Medicine)  Huog Garzon DO     Review of Systems   All other systems reviewed and are negative.      Objective   Vitals:  /84 (BP Location: Left arm, Patient Position: Sitting)   Pulse 74   Ht 1.791 m (5' 10.5\")   Wt 105 kg (232 lb)   BMI 32.82 kg/m²       Physical Exam  Vitals and nursing note reviewed.   Constitutional:       General: He is not in acute distress.     Appearance: Normal appearance. He is well-developed. He is obese. He is not toxic-appearing.   HENT:      Head: Normocephalic and atraumatic.      Right Ear: Tympanic membrane and external ear normal.      Left Ear: Tympanic membrane and external ear normal.      Nose: Nose normal.      Mouth/Throat:      Mouth: Mucous membranes are moist.      Pharynx: Oropharynx is clear. No oropharyngeal exudate or posterior oropharyngeal erythema.      Tonsils: No tonsillar exudate. 2+ on the right. 2+ on the left.   Eyes:      Extraocular Movements: Extraocular movements intact.      Conjunctiva/sclera: Conjunctivae normal.   Cardiovascular:      Rate and Rhythm: Normal rate and regular rhythm.      " Pulses: Normal pulses.      Heart sounds: Normal heart sounds. No murmur heard.  Pulmonary:      Effort: Pulmonary effort is normal.      Breath sounds: Normal breath sounds.   Abdominal:      General: Abdomen is flat. Bowel sounds are normal.      Palpations: Abdomen is soft.   Musculoskeletal:      Cervical back: Neck supple.   Feet:      Right foot:      Skin integrity: Skin integrity normal. No ulcer, blister, skin breakdown, erythema, warmth or callus.      Toenail Condition: Right toenails are normal.      Left foot:      Skin integrity: Skin integrity normal. No ulcer, blister, skin breakdown, erythema, warmth or callus.      Toenail Condition: Left toenails are normal.   Lymphadenopathy:      Cervical: No cervical adenopathy.   Skin:     General: Skin is warm and dry.      Capillary Refill: Capillary refill takes more than 3 seconds.      Findings: No rash.   Neurological:      Mental Status: He is alert. Mental status is at baseline.      Sensory: Sensation is intact.   Psychiatric:         Mood and Affect: Mood normal.         Behavior: Behavior normal.         Thought Content: Thought content normal.         Judgment: Judgment normal.         Assessment/Plan   Problem List Items Addressed This Visit       Adrenal adenoma    Current Assessment & Plan     Stable and  unchanged  inactive by prevous bloodwork and imaging          Relevant Orders    Comprehensive Metabolic Panel    Hemoglobin A1C    Lipid Panel    Albumin , Urine Random    Vitamin D 25-Hydroxy,Total    Benign essential hypertension    Gastroesophageal reflux disease without esophagitis    Relevant Orders    Comprehensive Metabolic Panel    Hemoglobin A1C    Lipid Panel    Albumin , Urine Random    Vitamin D 25-Hydroxy,Total    Hyperlipidemia    Current Assessment & Plan     Statin intolerant does not wish to start start PS C K9 inhibitor therapy cannot afford newer antinonstatin agents for treatment of dyslipidemia repeat levels         Class  1 obesity due to excess calories with serious comorbidity and body mass index (BMI) of 32.0 to 32.9 in adult    Current Assessment & Plan     Improvement seen with diet and exercise continue weight loss strategies         Paroxysmal atrial fibrillation (CMS/[facility])    Current Assessment & Plan     Rate and rhythm controlled continue with bisoprolol with aspirin for reduction risk and stroke could benefit from use of DOAC but cannot afford and poor compliance seen with use of warfarin         Relevant Orders    Comprehensive Metabolic Panel    Hemoglobin A1C    Lipid Panel    Albumin , Urine Random    Vitamin D 25-Hydroxy,Total    Peripheral vascular disease (CMS/[facility])    Current Assessment & Plan     Stable at this time without any signs or symptoms of claudication         Relevant Orders    Comprehensive Metabolic Panel    Hemoglobin A1C    Lipid Panel    Albumin , Urine Random    Vitamin D 25-Hydroxy,Total    Stenosis of left carotid artery    Current Assessment & Plan     Repeat carotid Doppler reveals no evidence of progression continue on secondary prevention         Relevant Orders    Comprehensive Metabolic Panel    Hemoglobin A1C    Lipid Panel    Albumin , Urine Random    Vitamin D 25-Hydroxy,Total    Type 2 diabetes mellitus with hyperglycemia, without long-term current use of insulin (CMS/HCC) - Primary    Current Assessment & Plan     Continue with diet control due to cost of medication reevaluate hemoglobin A1c         Relevant Orders    Comprehensive Metabolic Panel    Hemoglobin A1C    Lipid Panel    Albumin , Urine Random    Vitamin D 25-Hydroxy,Total    RESOLVED: Vitamin D deficiency    Relevant Orders    Comprehensive Metabolic Panel    Hemoglobin A1C    Lipid Panel    Albumin , Urine Random    Vitamin D 25-Hydroxy,Total    Allergic conjunctivitis and rhinitis, left    Relevant Orders    Comprehensive Metabolic Panel    Hemoglobin A1C    Lipid Panel    Albumin , Urine Random    Vitamin D  25-Hydroxy,Total    Seborrheic dermatitis of scalp    Current Assessment & Plan     Refill ketoconazole shampoo         Relevant Medications    ketoconazole (NIZOral) 2 % shampoo    Elevated prostate specific antigen (PSA)    Current Assessment & Plan     PSA elevated 7.8 continue to follow closely with urologist stable unchanged

## 2023-07-31 NOTE — ASSESSMENT & PLAN NOTE
>>ASSESSMENT AND PLAN FOR ADRENAL ADENOMA WRITTEN ON 7/31/2023  1:48 PM BY PAU WOLF, DO    Stable and  unchanged  inactive by prevous bloodwork and imaging

## 2023-07-31 NOTE — PROGRESS NOTES
"Subjective   Patient ID: Joey Jean is a 58 y.o. male who presents for Follow-up and Right Side pain  (States this is on going for a long time).    HPI     Review of Systems    Objective   /84 (BP Location: Left arm, Patient Position: Sitting)   Pulse 74   Ht 1.791 m (5' 10.5\")   Wt 105 kg (232 lb)   BMI 32.82 kg/m²     Physical Exam    Assessment/Plan          "

## 2023-07-31 NOTE — ASSESSMENT & PLAN NOTE
Rate and rhythm controlled continue with bisoprolol with aspirin for reduction risk and stroke could benefit from use of DOAC but cannot afford and poor compliance seen with use of warfarin

## 2023-07-31 NOTE — ASSESSMENT & PLAN NOTE
Statin intolerant does not wish to start start PS C K9 inhibitor therapy cannot afford newer antinonstatin agents for treatment of dyslipidemia repeat levels

## 2023-10-16 ENCOUNTER — TELEPHONE (OUTPATIENT)
Dept: PRIMARY CARE | Facility: CLINIC | Age: 58
End: 2023-10-16
Payer: MEDICAID

## 2023-10-16 DIAGNOSIS — J22 ACUTE LOWER RESPIRATORY TRACT INFECTION: Primary | ICD-10-CM

## 2023-10-16 NOTE — TELEPHONE ENCOUNTER
Chief Complaint:    Symptoms: cough, sinus pressure, sore throat, eye pain, bringing up phlegm-brownish/green, wheezing     Duration: 3 days     Treatments: Robutessin     Patient Requesting: Recommendation     Did the patient have their Covid Vaccine? Yes- updated with booster and just got the flu shot a week ago     Pharmacy: [place]     **Hasn't covid tested-he will take one and call back with results

## 2023-10-17 RX ORDER — AZITHROMYCIN 250 MG/1
TABLET, FILM COATED ORAL
Qty: 6 TABLET | Refills: 0 | Status: SHIPPED | OUTPATIENT
Start: 2023-10-17 | End: 2023-10-22

## 2023-10-17 RX ORDER — AZITHROMYCIN 250 MG/1
TABLET, FILM COATED ORAL
COMMUNITY
Start: 2023-09-07 | End: 2023-10-17 | Stop reason: ALTCHOICE

## 2023-10-23 ENCOUNTER — TELEPHONE (OUTPATIENT)
Dept: PRIMARY CARE | Facility: CLINIC | Age: 58
End: 2023-10-23
Payer: MEDICAID

## 2023-10-23 DIAGNOSIS — I10 BENIGN ESSENTIAL HYPERTENSION: Primary | ICD-10-CM

## 2023-10-23 RX ORDER — BISOPROLOL FUMARATE 10 MG/1
10 TABLET, FILM COATED ORAL DAILY
Qty: 90 TABLET | Refills: 3 | Status: SHIPPED | OUTPATIENT
Start: 2023-10-23

## 2023-10-23 NOTE — TELEPHONE ENCOUNTER
Rx Refill Request Telephone Encounter    Name:  Joey Jean  :  [id number]  Medication Name:  Bisoprolol  10 mg          Specific Pharmacy location:  [place]

## 2023-10-31 ENCOUNTER — APPOINTMENT (OUTPATIENT)
Dept: PRIMARY CARE | Facility: CLINIC | Age: 58
End: 2023-10-31
Payer: MEDICAID

## 2023-11-06 RX ORDER — LORAZEPAM 2 MG/1
TABLET ORAL EVERY 24 HOURS
COMMUNITY
Start: 2022-10-28
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
## 2023-11-10 ENCOUNTER — OFFICE VISIT (OUTPATIENT)
Dept: CARDIOLOGY | Facility: CLINIC | Age: 58
End: 2023-11-10
Payer: MEDICAID

## 2023-11-10 VITALS
WEIGHT: 234 LBS | HEART RATE: 76 BPM | BODY MASS INDEX: 33.1 KG/M2 | SYSTOLIC BLOOD PRESSURE: 134 MMHG | DIASTOLIC BLOOD PRESSURE: 82 MMHG

## 2023-11-10 DIAGNOSIS — I69.30 HISTORY OF STROKE WITH RESIDUAL EFFECTS: ICD-10-CM

## 2023-11-10 DIAGNOSIS — I48.0 PAROXYSMAL ATRIAL FIBRILLATION (MULTI): ICD-10-CM

## 2023-11-10 DIAGNOSIS — I10 BENIGN ESSENTIAL HYPERTENSION: Primary | ICD-10-CM

## 2023-11-10 PROBLEM — E66.01 MORBID OBESITY (MULTI): Status: ACTIVE | Noted: 2023-04-12

## 2023-11-10 PROBLEM — I95.1 ORTHOSTATIC HYPOTENSION: Status: ACTIVE | Noted: 2023-11-10

## 2023-11-10 PROBLEM — H10.10 ALLERGIC CONJUNCTIVITIS: Status: ACTIVE | Noted: 2023-04-28

## 2023-11-10 PROBLEM — M51.36 DEGENERATION OF INTERVERTEBRAL DISC OF LUMBAR REGION: Status: ACTIVE | Noted: 2022-07-22

## 2023-11-10 PROBLEM — E66.01 SEVERE OBESITY (MULTI): Status: ACTIVE | Noted: 2023-11-10

## 2023-11-10 PROBLEM — Z86.73 HISTORY OF CEREBROVASCULAR ACCIDENT: Status: ACTIVE | Noted: 2023-01-24

## 2023-11-10 PROBLEM — G89.29 CHRONIC PAIN: Status: ACTIVE | Noted: 2023-11-10

## 2023-11-10 PROBLEM — R05.9 COUGH, UNSPECIFIED: Status: ACTIVE | Noted: 2023-01-11

## 2023-11-10 PROBLEM — K62.9 RECTAL DISORDER: Status: ACTIVE | Noted: 2023-11-10

## 2023-11-10 PROBLEM — R05.9 COUGH: Status: ACTIVE | Noted: 2023-11-10

## 2023-11-10 PROBLEM — D35.01 ADENOMA OF RIGHT ADRENAL GLAND: Status: ACTIVE | Noted: 2022-12-01

## 2023-11-10 PROBLEM — M51.369 DEGENERATION OF INTERVERTEBRAL DISC OF LUMBAR REGION: Status: ACTIVE | Noted: 2022-07-22

## 2023-11-10 PROCEDURE — 3075F SYST BP GE 130 - 139MM HG: CPT | Performed by: PHYSICIAN ASSISTANT

## 2023-11-10 PROCEDURE — 3079F DIAST BP 80-89 MM HG: CPT | Performed by: PHYSICIAN ASSISTANT

## 2023-11-10 PROCEDURE — 3044F HG A1C LEVEL LT 7.0%: CPT | Performed by: PHYSICIAN ASSISTANT

## 2023-11-10 PROCEDURE — 3008F BODY MASS INDEX DOCD: CPT | Performed by: PHYSICIAN ASSISTANT

## 2023-11-10 PROCEDURE — 1036F TOBACCO NON-USER: CPT | Performed by: PHYSICIAN ASSISTANT

## 2023-11-10 PROCEDURE — 99213 OFFICE O/P EST LOW 20 MIN: CPT | Performed by: PHYSICIAN ASSISTANT

## 2023-11-10 ASSESSMENT — ENCOUNTER SYMPTOMS
VOMITING: 0
WHEEZING: 0
SHORTNESS OF BREATH: 0
FEVER: 0
ABDOMINAL PAIN: 0
ORTHOPNEA: 0
WEAKNESS: 0
DIARRHEA: 0
PALPITATIONS: 0
DYSURIA: 0
NAUSEA: 0

## 2023-11-10 NOTE — PATIENT INSTRUCTIONS
Please continue your current medications.  If you have any change in your cardiorespiratory status please call the office right away.  We will arrange for your yearly visit with your cardiologist Dr. Ruiz in 6 months.

## 2023-12-07 ENCOUNTER — TELEPHONE (OUTPATIENT)
Dept: UROLOGY | Facility: CLINIC | Age: 58
End: 2023-12-07
Payer: MEDICAID

## 2023-12-07 DIAGNOSIS — Z12.5 SCREENING PSA (PROSTATE SPECIFIC ANTIGEN): Primary | ICD-10-CM

## 2023-12-20 ENCOUNTER — TELEPHONE (OUTPATIENT)
Dept: PRIMARY CARE | Facility: CLINIC | Age: 58
End: 2023-12-20
Payer: MEDICAID

## 2023-12-20 DIAGNOSIS — E11.65 TYPE 2 DIABETES MELLITUS WITH HYPERGLYCEMIA, WITHOUT LONG-TERM CURRENT USE OF INSULIN (MULTI): ICD-10-CM

## 2023-12-20 RX ORDER — LANCETS 33 GAUGE
EACH MISCELLANEOUS
Qty: 100 EACH | Refills: 2 | Status: SHIPPED | OUTPATIENT
Start: 2023-12-20 | End: 2024-02-07 | Stop reason: SDUPTHER

## 2023-12-20 NOTE — TELEPHONE ENCOUNTER
Rx Refill Request Telephone Encounter    Name:  Joey Jean  :  [id number]  Medication Name:  OneTouch Delica Plus Lancet  33 gauge          Specific Pharmacy location:  [facility]

## 2024-01-24 ENCOUNTER — APPOINTMENT (OUTPATIENT)
Dept: PRIMARY CARE | Facility: CLINIC | Age: 59
End: 2024-01-24
Payer: MEDICAID

## 2024-02-07 ENCOUNTER — TELEPHONE (OUTPATIENT)
Dept: PRIMARY CARE | Facility: CLINIC | Age: 59
End: 2024-02-07
Payer: MEDICAID

## 2024-02-07 DIAGNOSIS — R21 RASH: ICD-10-CM

## 2024-02-07 DIAGNOSIS — E11.65 TYPE 2 DIABETES MELLITUS WITH HYPERGLYCEMIA, WITHOUT LONG-TERM CURRENT USE OF INSULIN (MULTI): ICD-10-CM

## 2024-02-07 RX ORDER — LANCETS 33 GAUGE
EACH MISCELLANEOUS
Qty: 100 EACH | Refills: 2 | Status: SHIPPED | OUTPATIENT
Start: 2024-02-07

## 2024-02-07 RX ORDER — BLOOD SUGAR DIAGNOSTIC
1 STRIP MISCELLANEOUS 2 TIMES DAILY
Qty: 100 STRIP | Refills: 3 | Status: SHIPPED | OUTPATIENT
Start: 2024-02-07 | End: 2024-04-01 | Stop reason: SDUPTHER

## 2024-02-07 RX ORDER — ISOPROPYL ALCOHOL 70 ML/100ML
SWAB TOPICAL
Qty: 100 EACH | Refills: 3 | Status: SHIPPED | OUTPATIENT
Start: 2024-02-07

## 2024-02-07 NOTE — TELEPHONE ENCOUNTER
med refill   lancets (OneTouch Delica Plus Lancet) 33 gauge misc [[id number]]   alcohol swabs pads, medicated [[id number]]   OneTouch Ultra Test strip [[id number]]     [id number] - [facility] [address] [place] [address] [address] [address]  Phone: [phone]  Fax: [phone]

## 2024-03-06 DIAGNOSIS — I10 BENIGN ESSENTIAL HYPERTENSION: Primary | ICD-10-CM

## 2024-03-06 NOTE — TELEPHONE ENCOUNTER
----- Message from Erin Anderson sent at 3/6/2024 11:24 AM EST -----  Regarding: med refill  He needs his nifedipine refilled, uses [facility] in [place]

## 2024-03-07 RX ORDER — NIFEDIPINE 30 MG/1
30 TABLET, FILM COATED, EXTENDED RELEASE ORAL 2 TIMES DAILY
Qty: 180 TABLET | Refills: 0 | Status: SHIPPED | OUTPATIENT
Start: 2024-03-07 | End: 2024-03-27 | Stop reason: WASHOUT

## 2024-03-27 ENCOUNTER — OFFICE VISIT (OUTPATIENT)
Dept: PRIMARY CARE | Facility: CLINIC | Age: 59
End: 2024-03-27
Payer: MEDICAID

## 2024-03-27 ENCOUNTER — LAB (OUTPATIENT)
Dept: LAB | Facility: LAB | Age: 59
End: 2024-03-27
Payer: MEDICAID

## 2024-03-27 VITALS
SYSTOLIC BLOOD PRESSURE: 126 MMHG | BODY MASS INDEX: 32.9 KG/M2 | HEART RATE: 90 BPM | HEIGHT: 71 IN | DIASTOLIC BLOOD PRESSURE: 82 MMHG | WEIGHT: 235 LBS

## 2024-03-27 DIAGNOSIS — E11.69 DYSLIPIDEMIA ASSOCIATED WITH TYPE 2 DIABETES MELLITUS (MULTI): ICD-10-CM

## 2024-03-27 DIAGNOSIS — E11.65 TYPE 2 DIABETES MELLITUS WITH HYPERGLYCEMIA, WITHOUT LONG-TERM CURRENT USE OF INSULIN (MULTI): ICD-10-CM

## 2024-03-27 DIAGNOSIS — I48.0 PAROXYSMAL ATRIAL FIBRILLATION (MULTI): ICD-10-CM

## 2024-03-27 DIAGNOSIS — I73.9 PERIPHERAL VASCULAR DISEASE (CMS-HCC): ICD-10-CM

## 2024-03-27 DIAGNOSIS — D35.01 ADENOMA OF RIGHT ADRENAL GLAND: ICD-10-CM

## 2024-03-27 DIAGNOSIS — H10.12 ALLERGIC CONJUNCTIVITIS AND RHINITIS, LEFT: ICD-10-CM

## 2024-03-27 DIAGNOSIS — E78.5 DYSLIPIDEMIA ASSOCIATED WITH TYPE 2 DIABETES MELLITUS (MULTI): ICD-10-CM

## 2024-03-27 DIAGNOSIS — J30.9 ALLERGIC CONJUNCTIVITIS AND RHINITIS, LEFT: ICD-10-CM

## 2024-03-27 DIAGNOSIS — K21.9 GASTROESOPHAGEAL REFLUX DISEASE WITHOUT ESOPHAGITIS: ICD-10-CM

## 2024-03-27 DIAGNOSIS — Z12.5 SCREENING PSA (PROSTATE SPECIFIC ANTIGEN): ICD-10-CM

## 2024-03-27 DIAGNOSIS — I10 BENIGN ESSENTIAL HYPERTENSION: Primary | ICD-10-CM

## 2024-03-27 DIAGNOSIS — K75.81 NONALCOHOLIC STEATOHEPATITIS (NASH): ICD-10-CM

## 2024-03-27 DIAGNOSIS — E66.09 CLASS 1 OBESITY DUE TO EXCESS CALORIES WITH SERIOUS COMORBIDITY AND BODY MASS INDEX (BMI) OF 32.0 TO 32.9 IN ADULT: ICD-10-CM

## 2024-03-27 DIAGNOSIS — R97.20 ELEVATED PROSTATE SPECIFIC ANTIGEN (PSA): ICD-10-CM

## 2024-03-27 DIAGNOSIS — I65.22 STENOSIS OF LEFT CAROTID ARTERY: ICD-10-CM

## 2024-03-27 DIAGNOSIS — E55.9 VITAMIN D DEFICIENCY: ICD-10-CM

## 2024-03-27 PROBLEM — G89.4 CHRONIC PAIN SYNDROME: Status: RESOLVED | Noted: 2023-05-01 | Resolved: 2024-03-27

## 2024-03-27 PROBLEM — E66.01 SEVERE OBESITY (MULTI): Status: RESOLVED | Noted: 2023-11-10 | Resolved: 2024-03-27

## 2024-03-27 PROBLEM — E66.01 MORBID OBESITY (MULTI): Status: RESOLVED | Noted: 2023-04-12 | Resolved: 2024-03-27

## 2024-03-27 PROBLEM — R05.9 COUGH: Status: RESOLVED | Noted: 2023-11-10 | Resolved: 2024-03-27

## 2024-03-27 PROBLEM — G89.29 CHRONIC PAIN: Status: RESOLVED | Noted: 2023-11-10 | Resolved: 2024-03-27

## 2024-03-27 PROBLEM — R05.9 COUGH, UNSPECIFIED: Status: RESOLVED | Noted: 2023-01-11 | Resolved: 2024-03-27

## 2024-03-27 PROBLEM — K62.9 RECTAL DISORDER: Status: RESOLVED | Noted: 2023-11-10 | Resolved: 2024-03-27

## 2024-03-27 LAB
25(OH)D3 SERPL-MCNC: 21 NG/ML (ref 30–100)
ALBUMIN SERPL BCP-MCNC: 4.3 G/DL (ref 3.4–5)
ALP SERPL-CCNC: 68 U/L (ref 33–120)
ALT SERPL W P-5'-P-CCNC: 25 U/L (ref 10–52)
ANION GAP SERPL CALC-SCNC: 14 MMOL/L (ref 10–20)
AST SERPL W P-5'-P-CCNC: 17 U/L (ref 9–39)
BILIRUB SERPL-MCNC: 0.8 MG/DL (ref 0–1.2)
BUN SERPL-MCNC: 19 MG/DL (ref 6–23)
CALCIUM SERPL-MCNC: 9.5 MG/DL (ref 8.6–10.3)
CHLORIDE SERPL-SCNC: 101 MMOL/L (ref 98–107)
CHOLEST SERPL-MCNC: 253 MG/DL (ref 0–199)
CHOLESTEROL/HDL RATIO: 5.4
CO2 SERPL-SCNC: 23 MMOL/L (ref 21–32)
CREAT SERPL-MCNC: 0.95 MG/DL (ref 0.5–1.3)
CREAT UR-MCNC: 55 MG/DL (ref 20–370)
EGFRCR SERPLBLD CKD-EPI 2021: >90 ML/MIN/1.73M*2
GLUCOSE SERPL-MCNC: 163 MG/DL (ref 74–99)
HDLC SERPL-MCNC: 46.8 MG/DL
LDLC SERPL CALC-MCNC: 173 MG/DL
MICROALBUMIN UR-MCNC: 315.2 MG/L
MICROALBUMIN/CREAT UR: 573.1 UG/MG CREAT
NON HDL CHOLESTEROL: 206 MG/DL (ref 0–149)
POTASSIUM SERPL-SCNC: 4.1 MMOL/L (ref 3.5–5.3)
PROT SERPL-MCNC: 7.3 G/DL (ref 6.4–8.2)
PSA SERPL-MCNC: 8.93 NG/ML
SODIUM SERPL-SCNC: 134 MMOL/L (ref 136–145)
TRIGL SERPL-MCNC: 164 MG/DL (ref 0–149)
VLDL: 33 MG/DL (ref 0–40)

## 2024-03-27 PROCEDURE — 99214 OFFICE O/P EST MOD 30 MIN: CPT | Performed by: INTERNAL MEDICINE

## 2024-03-27 PROCEDURE — 82043 UR ALBUMIN QUANTITATIVE: CPT

## 2024-03-27 PROCEDURE — 82306 VITAMIN D 25 HYDROXY: CPT

## 2024-03-27 PROCEDURE — 36415 COLL VENOUS BLD VENIPUNCTURE: CPT

## 2024-03-27 PROCEDURE — 3079F DIAST BP 80-89 MM HG: CPT | Performed by: INTERNAL MEDICINE

## 2024-03-27 PROCEDURE — 3062F POS MACROALBUMINURIA REV: CPT | Performed by: INTERNAL MEDICINE

## 2024-03-27 PROCEDURE — 82570 ASSAY OF URINE CREATININE: CPT

## 2024-03-27 PROCEDURE — 84153 ASSAY OF PSA TOTAL: CPT

## 2024-03-27 PROCEDURE — 3008F BODY MASS INDEX DOCD: CPT | Performed by: INTERNAL MEDICINE

## 2024-03-27 PROCEDURE — 80061 LIPID PANEL: CPT

## 2024-03-27 PROCEDURE — 3074F SYST BP LT 130 MM HG: CPT | Performed by: INTERNAL MEDICINE

## 2024-03-27 PROCEDURE — 3050F LDL-C >= 130 MG/DL: CPT | Performed by: INTERNAL MEDICINE

## 2024-03-27 PROCEDURE — 83036 HEMOGLOBIN GLYCOSYLATED A1C: CPT

## 2024-03-27 PROCEDURE — 80053 COMPREHEN METABOLIC PANEL: CPT

## 2024-03-27 PROCEDURE — 1036F TOBACCO NON-USER: CPT | Performed by: INTERNAL MEDICINE

## 2024-03-27 RX ORDER — AMLODIPINE BESYLATE 5 MG/1
5 TABLET ORAL DAILY
Qty: 90 TABLET | Refills: 3 | Status: SHIPPED | OUTPATIENT
Start: 2024-03-27 | End: 2024-04-18 | Stop reason: WASHOUT

## 2024-03-27 ASSESSMENT — ENCOUNTER SYMPTOMS
ARTHRALGIAS: 1
SINUS PRESSURE: 1
FATIGUE: 1
BACK PAIN: 1
RHINORRHEA: 1
SINUS PAIN: 1
NECK STIFFNESS: 1

## 2024-03-27 NOTE — ASSESSMENT & PLAN NOTE
Check FibroSure scanning to evaluate for chronic status of hepatitis nonalcoholic in the setting of morbid obesity no signs of biliary obstruction patient with generalized epigastric fullness and tenderness but MR of the liver and pancreas 2022 was unremarkable

## 2024-03-27 NOTE — PROGRESS NOTES
"Subjective   Patient ID: Joey Jean is a 59 y.o. male who presents for Follow-up (Head pain was given antibiotic ).    HPI     Review of Systems    Objective   /82 (Patient Position: Sitting)   Pulse 90   Ht 1.791 m (5' 10.5\")   Wt 107 kg (235 lb)   BMI 33.24 kg/m²     Physical Exam    Assessment/Plan          "

## 2024-03-27 NOTE — ASSESSMENT & PLAN NOTE
Patient with absolute intolerance to all statins consider bempedoic acid with ezetimibe plus or minus consideration for Repatha injectable reevaluate with next visit

## 2024-03-27 NOTE — ASSESSMENT & PLAN NOTE
Reevaluate hemoglobin A1c and look at options for treatment to reduce patient's risk of cardiovascular disease consideration of SGLT2 inhibitor therapy plus or minus GLP-1 agonist therapy

## 2024-03-27 NOTE — PROGRESS NOTES
"Subjective   Reason for Visit: oJey Jean is an 59 y.o. male here for a fu  visit.     Past Medical, Surgical, and Family History reviewed and updated in chart.    Reviewed all medications by prescribing practitioner or clinical pharmacist (such as prescriptions, OTCs, herbal therapies and supplements) and documented in the medical record.    HPI    Patient Care Team:  Hugo Garzon DO as PCP - General (Internal Medicine)  Hugo Garzon DO     Review of Systems   Constitutional:  Positive for fatigue.   HENT:  Positive for rhinorrhea, sinus pressure and sinus pain.    Musculoskeletal:  Positive for arthralgias, back pain and neck stiffness.   All other systems reviewed and are negative.      Objective   Vitals:  /82 (Patient Position: Sitting)   Pulse 90   Ht 1.791 m (5' 10.5\")   Wt 107 kg (235 lb)   BMI 33.24 kg/m²       Physical Exam  Vitals and nursing note reviewed.   Constitutional:       General: He is not in acute distress.     Appearance: Normal appearance. He is well-developed. He is not toxic-appearing.   HENT:      Head: Normocephalic and atraumatic.      Right Ear: Tympanic membrane and external ear normal.      Left Ear: Tympanic membrane and external ear normal.      Nose: Nose normal.      Mouth/Throat:      Mouth: Mucous membranes are moist.      Pharynx: Oropharynx is clear. No oropharyngeal exudate or posterior oropharyngeal erythema.      Tonsils: No tonsillar exudate. 2+ on the right. 2+ on the left.   Eyes:      Extraocular Movements: Extraocular movements intact.      Conjunctiva/sclera: Conjunctivae normal.   Cardiovascular:      Rate and Rhythm: Normal rate and regular rhythm.      Pulses: Normal pulses.      Heart sounds: Normal heart sounds. No murmur heard.  Pulmonary:      Effort: Pulmonary effort is normal.      Breath sounds: Normal breath sounds.   Abdominal:      General: Abdomen is flat. Bowel sounds are normal.      Palpations: Abdomen is soft. "   Musculoskeletal:      Cervical back: Neck supple.   Feet:      Right foot:      Skin integrity: Skin integrity normal. No ulcer, blister, skin breakdown, erythema, warmth or callus.      Toenail Condition: Right toenails are normal.      Left foot:      Skin integrity: Skin integrity normal. No ulcer, blister, skin breakdown, erythema, warmth or callus.      Toenail Condition: Left toenails are normal.   Lymphadenopathy:      Cervical: No cervical adenopathy.   Skin:     General: Skin is warm and dry.      Capillary Refill: Capillary refill takes more than 3 seconds.      Findings: No rash.   Neurological:      Mental Status: He is alert. Mental status is at baseline.      Sensory: Sensation is intact.   Psychiatric:         Mood and Affect: Mood normal.         Behavior: Behavior normal.         Thought Content: Thought content normal.         Judgment: Judgment normal.         Assessment/Plan   Problem List Items Addressed This Visit       Benign essential hypertension - Primary    Current Assessment & Plan     Switch nifedipine to amlodipine 5 mg daily         Relevant Medications    amLODIPine (Norvasc) 5 mg tablet    Class 1 obesity due to excess calories with serious comorbidity and body mass index (BMI) of 32.0 to 32.9 in adult    Current Assessment & Plan     15 pound weight loss through better diet and exercise since last visit cutting down on soda         Paroxysmal atrial fibrillation (CMS/HCC)    Current Assessment & Plan     Continue with aspirin therapy not interested in anticoagulants         Relevant Medications    amLODIPine (Norvasc) 5 mg tablet    Peripheral vascular disease (CMS/HCC)    Current Assessment & Plan     Stable no signs or symptoms of claudication at this time patient intolerant to statin medication and defer statin or any other lower cholesterol-lowering therapy at this time reevaluate         Type 2 diabetes mellitus with hyperglycemia, without long-term current use of insulin  (CMS/HCC)    Current Assessment & Plan     Reevaluate hemoglobin A1c and look at options for treatment to reduce patient's risk of cardiovascular disease consideration of SGLT2 inhibitor therapy plus or minus GLP-1 agonist therapy         Elevated prostate specific antigen (PSA)    Current Assessment & Plan     Referral to urology for MRI and prostate biopsy         Relevant Orders    Referral to Urology    Adenoma of right adrenal gland    Current Assessment & Plan     >>ASSESSMENT AND PLAN FOR ADRENAL ADENOMA WRITTEN ON 3/27/2024  7:44 PM BY PAU WOLF, DO    Stable and deemed nonfunctional 2 cm diameter         Dyslipidemia associated with type 2 diabetes mellitus (CMS/HCC)    Current Assessment & Plan     Patient with absolute intolerance to all statins consider bempedoic acid with ezetimibe plus or minus consideration for Repatha injectable reevaluate with next visit         Nonalcoholic steatohepatitis (SCALES)    Current Assessment & Plan     Check FibroSure scanning to evaluate for chronic status of hepatitis nonalcoholic in the setting of morbid obesity no signs of biliary obstruction patient with generalized epigastric fullness and tenderness but MR of the liver and pancreas 2022 was unremarkable         Relevant Orders    SCALES FibroSure    Follow Up In [facility] - PCP - Established

## 2024-03-27 NOTE — ASSESSMENT & PLAN NOTE
>>ASSESSMENT AND PLAN FOR ADRENAL ADENOMA WRITTEN ON 3/27/2024  7:44 PM BY PAU WOLF, DO    Stable and deemed nonfunctional 2 cm diameter

## 2024-03-27 NOTE — ASSESSMENT & PLAN NOTE
Stable no signs or symptoms of claudication at this time patient intolerant to statin medication and defer statin or any other lower cholesterol-lowering therapy at this time reevaluate

## 2024-03-28 LAB
EST. AVERAGE GLUCOSE BLD GHB EST-MCNC: 177 MG/DL
HBA1C MFR BLD: 7.8 %

## 2024-04-01 ENCOUNTER — TELEPHONE (OUTPATIENT)
Dept: PRIMARY CARE | Facility: CLINIC | Age: 59
End: 2024-04-01
Payer: MEDICAID

## 2024-04-01 DIAGNOSIS — E11.65 TYPE 2 DIABETES MELLITUS WITH HYPERGLYCEMIA, WITHOUT LONG-TERM CURRENT USE OF INSULIN (MULTI): ICD-10-CM

## 2024-04-01 RX ORDER — BLOOD SUGAR DIAGNOSTIC
1 STRIP MISCELLANEOUS 2 TIMES DAILY
Qty: 100 STRIP | Refills: 3 | Status: SHIPPED | OUTPATIENT
Start: 2024-04-01

## 2024-04-01 NOTE — TELEPHONE ENCOUNTER
Refill request:    One Touch Ultra test strips. - He was given the Verio test strips instead of the Ultra    [facility]: [facility]    He hasn't started the Amlodipine. He wants to stay with the Nifedipine.

## 2024-04-02 ENCOUNTER — TELEPHONE (OUTPATIENT)
Dept: UROLOGY | Facility: CLINIC | Age: 59
End: 2024-04-02
Payer: MEDICAID

## 2024-04-02 DIAGNOSIS — Z12.5 SCREENING PSA (PROSTATE SPECIFIC ANTIGEN): Primary | ICD-10-CM

## 2024-04-02 NOTE — TELEPHONE ENCOUNTER
Patient coming in to see Dr. Luna the end of April, needs a new PSA order put in for him to have done prior.  Was last in 2022  Thank you

## 2024-04-03 ENCOUNTER — APPOINTMENT (OUTPATIENT)
Dept: VASCULAR SURGERY | Facility: CLINIC | Age: 59
End: 2024-04-03
Payer: MEDICAID

## 2024-04-15 ENCOUNTER — LAB (OUTPATIENT)
Dept: LAB | Facility: LAB | Age: 59
End: 2024-04-15
Payer: MEDICAID

## 2024-04-15 DIAGNOSIS — Z12.5 SCREENING PSA (PROSTATE SPECIFIC ANTIGEN): ICD-10-CM

## 2024-04-15 DIAGNOSIS — K75.81 NONALCOHOLIC STEATOHEPATITIS (NASH): ICD-10-CM

## 2024-04-15 LAB — PSA SERPL-MCNC: 7.77 NG/ML

## 2024-04-15 PROCEDURE — 36415 COLL VENOUS BLD VENIPUNCTURE: CPT

## 2024-04-15 PROCEDURE — 84153 ASSAY OF PSA TOTAL: CPT

## 2024-04-17 ENCOUNTER — TELEPHONE (OUTPATIENT)
Dept: PRIMARY CARE | Facility: CLINIC | Age: 59
End: 2024-04-17
Payer: MEDICAID

## 2024-04-17 DIAGNOSIS — I10 BENIGN ESSENTIAL HYPERTENSION: Primary | ICD-10-CM

## 2024-04-17 NOTE — TELEPHONE ENCOUNTER
Ambulated to room. POC initiated. Pt verbalized understanding. No c/o. No distress noted.      Refill request:  Nifedipine ER 30mg BID  [facility]    He doesn't ant to take the Amlodipine and prefers to take this

## 2024-04-18 RX ORDER — NIFEDIPINE 30 MG/1
30 TABLET, FILM COATED, EXTENDED RELEASE ORAL 2 TIMES DAILY
Qty: 180 TABLET | Refills: 3 | Status: SHIPPED | OUTPATIENT
Start: 2024-04-18 | End: 2025-04-18

## 2024-04-18 NOTE — TELEPHONE ENCOUNTER
He is out of the Nifedipine as of today. He really doesn't want to take the Amlodipine.  Please refill and send to [facility]

## 2024-04-25 ENCOUNTER — TELEPHONE (OUTPATIENT)
Dept: PRIMARY CARE | Facility: CLINIC | Age: 59
End: 2024-04-25

## 2024-04-25 ENCOUNTER — OFFICE VISIT (OUTPATIENT)
Dept: UROLOGY | Facility: CLINIC | Age: 59
End: 2024-04-25
Payer: MEDICAID

## 2024-04-25 DIAGNOSIS — N40.1 BENIGN PROSTATIC HYPERPLASIA WITH LOWER URINARY TRACT SYMPTOMS, SYMPTOM DETAILS UNSPECIFIED: ICD-10-CM

## 2024-04-25 DIAGNOSIS — R97.20 ELEVATED PSA: Primary | ICD-10-CM

## 2024-04-25 PROCEDURE — 99214 OFFICE O/P EST MOD 30 MIN: CPT | Performed by: UROLOGY

## 2024-04-25 PROCEDURE — 3050F LDL-C >= 130 MG/DL: CPT | Performed by: UROLOGY

## 2024-04-25 PROCEDURE — 3062F POS MACROALBUMINURIA REV: CPT | Performed by: UROLOGY

## 2024-04-25 PROCEDURE — 3008F BODY MASS INDEX DOCD: CPT | Performed by: UROLOGY

## 2024-04-25 PROCEDURE — 3051F HG A1C>EQUAL 7.0%<8.0%: CPT | Performed by: UROLOGY
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
## 2024-04-26 ENCOUNTER — TELEPHONE (OUTPATIENT)
Dept: PRIMARY CARE | Facility: CLINIC | Age: 59
End: 2024-04-26
Payer: MEDICAID

## 2024-05-10 ENCOUNTER — HOSPITAL ENCOUNTER (OUTPATIENT)
Dept: RADIOLOGY | Facility: CLINIC | Age: 59
Discharge: HOME | End: 2024-05-10
Payer: MEDICAID

## 2024-05-10 DIAGNOSIS — R97.20 ELEVATED PSA: ICD-10-CM

## 2024-05-10 DIAGNOSIS — N40.1 BENIGN PROSTATIC HYPERPLASIA WITH LOWER URINARY TRACT SYMPTOMS, SYMPTOM DETAILS UNSPECIFIED: ICD-10-CM

## 2024-05-10 PROCEDURE — 72197 MRI PELVIS W/O & W/DYE: CPT

## 2024-05-10 PROCEDURE — 72197 MRI PELVIS W/O & W/DYE: CPT | Performed by: RADIOLOGY

## 2024-05-10 PROCEDURE — A9575 INJ GADOTERATE MEGLUMI 0.1ML: HCPCS | Performed by: UROLOGY

## 2024-05-10 PROCEDURE — 2550000001 HC RX 255 CONTRASTS: Performed by: UROLOGY

## 2024-05-10 RX ORDER — GADOTERATE MEGLUMINE 376.9 MG/ML
0.2 INJECTION INTRAVENOUS
Status: COMPLETED | OUTPATIENT
Start: 2024-05-10 | End: 2024-05-10

## 2024-05-10 RX ADMIN — GADOTERATE MEGLUMINE 20 ML: 376.9 INJECTION INTRAVENOUS at 13:56

## 2024-05-16 ENCOUNTER — OFFICE VISIT (OUTPATIENT)
Dept: CARDIOLOGY | Facility: CLINIC | Age: 59
End: 2024-05-16
Payer: MEDICAID

## 2024-05-16 VITALS
HEART RATE: 76 BPM | BODY MASS INDEX: 37.13 KG/M2 | WEIGHT: 245 LBS | HEIGHT: 68 IN | SYSTOLIC BLOOD PRESSURE: 152 MMHG | DIASTOLIC BLOOD PRESSURE: 100 MMHG

## 2024-05-16 DIAGNOSIS — I10 BENIGN ESSENTIAL HYPERTENSION: ICD-10-CM

## 2024-05-16 DIAGNOSIS — E78.5 DYSLIPIDEMIA ASSOCIATED WITH TYPE 2 DIABETES MELLITUS (MULTI): Primary | ICD-10-CM

## 2024-05-16 DIAGNOSIS — E11.69 DYSLIPIDEMIA ASSOCIATED WITH TYPE 2 DIABETES MELLITUS (MULTI): Primary | ICD-10-CM

## 2024-05-16 DIAGNOSIS — I69.30 HISTORY OF STROKE WITH RESIDUAL EFFECTS: ICD-10-CM

## 2024-05-16 DIAGNOSIS — I48.0 PAROXYSMAL ATRIAL FIBRILLATION (MULTI): ICD-10-CM

## 2024-05-16 PROCEDURE — 93000 ELECTROCARDIOGRAM COMPLETE: CPT | Performed by: INTERNAL MEDICINE

## 2024-05-16 PROCEDURE — 1036F TOBACCO NON-USER: CPT | Performed by: INTERNAL MEDICINE

## 2024-05-16 PROCEDURE — 3050F LDL-C >= 130 MG/DL: CPT | Performed by: INTERNAL MEDICINE

## 2024-05-16 PROCEDURE — 3051F HG A1C>EQUAL 7.0%<8.0%: CPT | Performed by: INTERNAL MEDICINE

## 2024-05-16 PROCEDURE — 3077F SYST BP >= 140 MM HG: CPT | Performed by: INTERNAL MEDICINE

## 2024-05-16 PROCEDURE — 3008F BODY MASS INDEX DOCD: CPT | Performed by: INTERNAL MEDICINE

## 2024-05-16 PROCEDURE — 99214 OFFICE O/P EST MOD 30 MIN: CPT | Performed by: INTERNAL MEDICINE

## 2024-05-16 PROCEDURE — 3062F POS MACROALBUMINURIA REV: CPT | Performed by: INTERNAL MEDICINE

## 2024-05-16 PROCEDURE — 3080F DIAST BP >= 90 MM HG: CPT | Performed by: INTERNAL MEDICINE

## 2024-05-16 ASSESSMENT — ENCOUNTER SYMPTOMS
OCCASIONAL FEELINGS OF UNSTEADINESS: 0
LOSS OF SENSATION IN FEET: 0
DEPRESSION: 1

## 2024-05-16 NOTE — PROGRESS NOTES
"Chief Complaint:   Follow-up (6 month f/u)     History Of Present Illness:    Joey Jean is a 59 y.o. male presenting for annual follow-up.    He has a history of paroxysmal atrial fibrillation complicated by stroke in the past who presents to our clinic for his yearly cardiovascular follow-up. The patient states that from the perspective of cardiovascular disease things are doing stable he denies having any palpitations, shortness of breath, worsening dyspnea on effort. He was recently diagnosed with fatty liver due to a right upper quadrant abdominal pain and he was told that he has SCALES. The patient admits that he eats lots of processed and fast food which could be contributing to his fatty liver. He denies drinking alcohol following his diagnosis with atrial fibrillation. He has quit smoking. He has hyperlipidemia, diabetes, uncontrolled HTN. Has back pain and cant exercise. Gets left sides chest pain when he uses the left arm going on for years.  Reviewed chart, had a episode of rapid A-fib after not taking bisoprolol converted spontaneously to sinus rhythm.  Went and got a second opinion from Dr. Munoz.  Tells me he checks his blood pressure at home with the machine that talks to him and his blood pressure is reportedly normal.      Last Recorded Vitals:  Vitals:    05/16/24 1520   BP: (!) 152/100   BP Location: Left arm   Pulse: 76   Weight: 111 kg (245 lb)   Height: 1.734 m (5' 8.25\")       Past Medical History:  He has a past medical history of Abnormal findings on diagnostic imaging of skull and head, not elsewhere classified (06/03/2020), Acute maxillary sinusitis, unspecified (04/08/2021), Acute maxillary sinusitis, unspecified (03/08/2021), Anal fissure, unspecified, Cervicalgia (12/04/2020), Impacted cerumen, bilateral (04/08/2021), Localized edema (06/23/2020), Occipital neuralgia (07/30/2020), Other intervertebral disc degeneration, lumbar region, Other specified abnormal findings of blood " chemistry (06/14/2021), Other specified anxiety disorders (03/22/2021), Other specified disorders of adrenal gland (Multi) (05/25/2021), Panic disorder (episodic paroxysmal anxiety) (04/24/2020), Personal history of diseases of the skin and subcutaneous tissue (01/21/2021), Personal history of other diseases of male genital organs (11/18/2019), Personal history of other diseases of the digestive system, Personal history of other diseases of the digestive system, Personal history of other diseases of the musculoskeletal system and connective tissue, Personal history of other diseases of the musculoskeletal system and connective tissue (05/28/2021), Personal history of other diseases of the musculoskeletal system and connective tissue (11/10/2019), Personal history of other diseases of the musculoskeletal system and connective tissue, Personal history of other diseases of the respiratory system (10/31/2020), Personal history of other diseases of the respiratory system (10/31/2019), Personal history of other diseases of the respiratory system, Personal history of other diseases of the respiratory system (01/22/2021), Personal history of other diseases of urinary system (10/28/2019), Personal history of other endocrine, nutritional and metabolic disease (02/01/2021), Personal history of other specified conditions (12/23/2020), Personal history of other specified conditions (01/04/2022), Personal history of other specified conditions (01/24/2020), Personal history of other specified conditions (12/31/2019), Personal history of other specified conditions (10/24/2019), Personal history of transient ischemic attack (TIA), and cerebral infarction without residual deficits, Personal history of traumatic brain injury, Pleurodynia (03/22/2021), Radiculopathy, cervical region (11/10/2019), Right upper quadrant pain (01/21/2021), and Tobacco use.    Past Surgical History:  He has no past surgical history on file.      Social  History:  He reports that he has quit smoking. His smoking use included cigarettes. He has never used smokeless tobacco. He reports that he does not drink alcohol and does not use drugs.    Family History:  Family History   Problem Relation Name Age of Onset    Other (cva) Other      Heart failure Other      Coronary artery disease Other      Migraines Other          Allergies:  Celecoxib, Penicillin v potassium, Penicillins, Sulfa (sulfonamide antibiotics), Sulfamethoxazole-trimethoprim, Topiramate, Trazodone, Atorvastatin, Grass pollen, House dust mite, Lisinopril, Losartan, Mold, Pitavastatin, Pravastatin, Ragweed, and Statins-hmg-coa reductase inhibitors    Outpatient Medications:  Current Outpatient Medications   Medication Instructions    alcohol swabs pads, medicated Use to clean area before checking blood sugar    aspirin 81 mg EC tablet 1 tablet, oral, Daily    bisoprolol (ZEBETA) 10 mg, oral, Daily    blood sugar diagnostic (OneTouch Ultra Test) strip 1 strip, miscellaneous, 2 times daily    cholecalciferol (Vitamin D-3) 125 MCG (5000 UT) capsule oral    cyclobenzaprine (FLEXERIL) 10 mg, oral, 3 times daily PRN    dexlansoprazole (Dexilant) 30 mg DR capsule 1 capsule, oral, Daily    fluorometholone (FML) 0.1 % ophthalmic suspension SHAKE LIQUID AND INSTILL 1 DROP IN RIGHT EYE FOUR TIMES DAILY    ketoconazole (NIZOral) 2 % shampoo Use 2 times a week as needed    lancets (OneTouch Delica Plus Lancet) 33 gauge misc USE TO CHECK BLOOD GLUCOSE LEVELS THREE TIMES DAILY    loratadine (Claritin) 10 mg tablet 1 tablet, oral, Daily    LORazepam (Ativan) 2 mg tablet Every 24 hours    NIFEdipine ER (ADALAT CC) 30 mg, oral, 2 times daily, Do not crush, chew, or split.    olopatadine (Patanol) 0.1 % ophthalmic solution 1 drop, Left Eye, 2 times daily PRN    OneTouch Ultra2 Meter misc use as directed    OxyCONTIN 15 mg, oral, Every 12 hours    Saline NasaL 0.65 % nasal spray 2 sprays, Each Nostril, 2 times daily        Physical Exam:  Physical Exam  Vitals reviewed.   Constitutional:       Appearance: Normal appearance.   Neck:      Vascular: No carotid bruit or JVD.   Cardiovascular:      Rate and Rhythm: Normal rate and regular rhythm.      Heart sounds: Normal heart sounds, S1 normal and S2 normal. No murmur heard.  Pulmonary:      Effort: Pulmonary effort is normal.      Breath sounds: Normal breath sounds.   Abdominal:      General: Abdomen is flat. Bowel sounds are normal.      Palpations: Abdomen is soft.   Musculoskeletal:      Right lower leg: No edema.      Left lower leg: No edema.   Skin:     General: Skin is warm.   Neurological:      Mental Status: He is alert. Mental status is at baseline.   Psychiatric:         Mood and Affect: Mood normal.         Behavior: Behavior normal.           Last Labs:  CBC -  Lab Results   Component Value Date    WBC 10.7 08/14/2023    HGB 16.2 08/14/2023    HCT 47.2 08/14/2023    MCV 87 08/14/2023     08/14/2023       CMP -  Lab Results   Component Value Date    CALCIUM 9.5 03/27/2024    PHOS 2.9 08/14/2023    PROT 7.3 03/27/2024    ALBUMIN 4.3 03/27/2024    AST 17 03/27/2024    ALT 25 03/27/2024    ALKPHOS 68 03/27/2024    BILITOT 0.8 03/27/2024       LIPID PANEL -   Lab Results   Component Value Date    CHOL 253 (H) 03/27/2024    TRIG 164 (H) 03/27/2024    HDL 46.8 03/27/2024    CHHDL 5.4 03/27/2024    LDLF 156 (H) 04/28/2023    VLDL 33 03/27/2024    NHDL 206 (H) 03/27/2024       RENAL FUNCTION PANEL -   Lab Results   Component Value Date    GLUCOSE 163 (H) 03/27/2024     (L) 03/27/2024    K 4.1 03/27/2024     03/27/2024    CO2 23 03/27/2024    ANIONGAP 14 03/27/2024    BUN 19 03/27/2024    CREATININE 0.95 03/27/2024    GFRMALE 72 08/14/2023    CALCIUM 9.5 03/27/2024    PHOS 2.9 08/14/2023    ALBUMIN 4.3 03/27/2024        Lab Results   Component Value Date    BNP 42 08/14/2023    HGBA1C 7.8 (H) 03/27/2024       Last Cardiology Tests:  ECG:  No results found  "for this or any previous visit from the past 1095 days.      Echo:  No results found for this or any previous visit from the past 1095 days.      Ejection Fractions:  No results found for: \"EF\"    Cath:  No results found for this or any previous visit from the past 1095 days.      Stress Test:  Nuclear Stress Test       Cardiac Imaging:  No results found for this or any previous visit from the past 1095 days.          Assessment/Plan     59 years old gentleman with history of paroxysmal atrial fibrillation complicated by stroke in the past and fatty liver , hyperlipidemia, uncontrolled hypertension, obesity who is here for his yearly cardiovascular follow-up.     Paroxysmal atrial fibrillation:  The patient's [id number] is 4 given the history of stroke, DM and hypertension, however given the history of stroke he should be on anticoagulation regardless of his [id number].  I discussed with the patient in details the importance of being on anticoagulation and the risk of stroke development if he is not taking his Eliquis. However, it did not appear that the patient was open to reinitiation of the medication at the current time. I will continue to  the patient with each visit regarding the importance of being on anticoagulation and the risk of developing stroke if is not on it.  He states he is on aspirin but this is not appropriate in reducing risk of stroke patient was notified.  Continue bisoprolol.    The patient is currently in a normal sinus rhythm. Appears from Dr. Koenig's note that he does not recommend keeping the patient on any antiarrhythmic medication at the current time given the risk of stroke off anticoagulation.     Hypertension:  Continue bisoprolol and nifedipine.  Pressure is uncontrolled on this regimen and patient refused to take any additional medications or change to the regimen. I discussed with him that his ideal blood pressure would be around 120/70, definitely less than 130/80. " Otherwise he has a risk of recurrent stroke, heart attack, kidney injury, vascular disease. We discussed lifestyle changes, plant-based diet increase fiber less processed food. Weight loss also advised. He states he cannot exercise.      Hyperlipidemiapatient has hyperlipidemia LDL around 170  and target should be less than 70. Patient is intolerant to statins. Recommended PCSK9 inhibitors. Patient refused.     He was having atypical chest pain. ECG is abnormal with inferior Q waves. Finally did his echocardiogram(normal LV function mild LVH. ) And a stress test in February 2023. Both were normal.     Overall patient is high risk, refusing preventive care and is at high risk of developing another complication in near future. We will monitor him closely and try to reinforce this at each visit.  Advised to bring home blood pressure monitor and home blood pressure log at next visit.       Sunitha Ruiz MD

## 2024-06-27 ENCOUNTER — APPOINTMENT (OUTPATIENT)
Dept: PRIMARY CARE | Facility: CLINIC | Age: 59
End: 2024-06-27
Payer: MEDICAID

## 2024-06-27 VITALS
HEART RATE: 68 BPM | WEIGHT: 240 LBS | SYSTOLIC BLOOD PRESSURE: 130 MMHG | DIASTOLIC BLOOD PRESSURE: 80 MMHG | BODY MASS INDEX: 35.55 KG/M2 | HEIGHT: 69 IN

## 2024-06-27 DIAGNOSIS — I48.0 PAROXYSMAL ATRIAL FIBRILLATION (MULTI): ICD-10-CM

## 2024-06-27 DIAGNOSIS — E11.69 DYSLIPIDEMIA ASSOCIATED WITH TYPE 2 DIABETES MELLITUS (MULTI): ICD-10-CM

## 2024-06-27 DIAGNOSIS — E66.01 CLASS 2 SEVERE OBESITY DUE TO EXCESS CALORIES WITH SERIOUS COMORBIDITY AND BODY MASS INDEX (BMI) OF 35.0 TO 35.9 IN ADULT (MULTI): ICD-10-CM

## 2024-06-27 DIAGNOSIS — E11.65 TYPE 2 DIABETES MELLITUS WITH HYPERGLYCEMIA, WITHOUT LONG-TERM CURRENT USE OF INSULIN (MULTI): ICD-10-CM

## 2024-06-27 DIAGNOSIS — R21 RASH: ICD-10-CM

## 2024-06-27 DIAGNOSIS — I73.9 PERIPHERAL VASCULAR DISEASE (CMS-HCC): ICD-10-CM

## 2024-06-27 DIAGNOSIS — K75.81 NONALCOHOLIC STEATOHEPATITIS (NASH): ICD-10-CM

## 2024-06-27 DIAGNOSIS — B35.6 TINEA CRURIS: Primary | ICD-10-CM

## 2024-06-27 DIAGNOSIS — E78.5 DYSLIPIDEMIA ASSOCIATED WITH TYPE 2 DIABETES MELLITUS (MULTI): ICD-10-CM

## 2024-06-27 DIAGNOSIS — R97.20 ELEVATED PROSTATE SPECIFIC ANTIGEN (PSA): ICD-10-CM

## 2024-06-27 PROCEDURE — 1036F TOBACCO NON-USER: CPT | Performed by: INTERNAL MEDICINE

## 2024-06-27 PROCEDURE — 3062F POS MACROALBUMINURIA REV: CPT | Performed by: INTERNAL MEDICINE

## 2024-06-27 PROCEDURE — 3075F SYST BP GE 130 - 139MM HG: CPT | Performed by: INTERNAL MEDICINE

## 2024-06-27 PROCEDURE — 3008F BODY MASS INDEX DOCD: CPT | Performed by: INTERNAL MEDICINE

## 2024-06-27 PROCEDURE — 3079F DIAST BP 80-89 MM HG: CPT | Performed by: INTERNAL MEDICINE

## 2024-06-27 PROCEDURE — 3050F LDL-C >= 130 MG/DL: CPT | Performed by: INTERNAL MEDICINE

## 2024-06-27 PROCEDURE — 3051F HG A1C>EQUAL 7.0%<8.0%: CPT | Performed by: INTERNAL MEDICINE

## 2024-06-27 PROCEDURE — 99213 OFFICE O/P EST LOW 20 MIN: CPT | Performed by: INTERNAL MEDICINE

## 2024-06-27 RX ORDER — ISOPROPYL ALCOHOL 70 ML/100ML
SWAB TOPICAL
Qty: 100 EACH | Refills: 3 | Status: SHIPPED | OUTPATIENT
Start: 2024-06-27

## 2024-06-27 RX ORDER — TERBINAFINE HYDROCHLORIDE 250 MG/1
250 TABLET ORAL DAILY
Qty: 14 TABLET | Refills: 0 | Status: SHIPPED | OUTPATIENT
Start: 2024-06-27 | End: 2024-07-11

## 2024-06-27 RX ORDER — BLOOD SUGAR DIAGNOSTIC
1 STRIP MISCELLANEOUS 2 TIMES DAILY
Qty: 100 STRIP | Refills: 3 | Status: SHIPPED | OUTPATIENT
Start: 2024-06-27

## 2024-06-27 RX ORDER — LANCETS 33 GAUGE
EACH MISCELLANEOUS
Qty: 100 EACH | Refills: 2 | Status: SHIPPED | OUTPATIENT
Start: 2024-06-27

## 2024-06-27 RX ORDER — PRENATAL VIT 91/IRON/FOLIC/DHA 28-975-200
COMBINATION PACKAGE (EA) ORAL 2 TIMES DAILY
Qty: 30 G | Refills: 1 | Status: SHIPPED | OUTPATIENT
Start: 2024-06-27 | End: 2024-07-27

## 2024-06-27 NOTE — ASSESSMENT & PLAN NOTE
Rate and rhythm controlled at this time on nifedipine ER 30 mg twice a day and bisoprolol 10 mg daily patient does not wish to change in therapy

## 2024-06-27 NOTE — ASSESSMENT & PLAN NOTE
Suboptimal blood sugar control at 7.8 patient refuses treatment at this time working on diet and lifestyle changes

## 2024-06-27 NOTE — PROGRESS NOTES
"Subjective   Reason for Visit: Joey Jean is an 59 y.o. male here for a fu  visit.     Past Medical, Surgical, and Family History reviewed and updated in chart.    Reviewed all medications by prescribing practitioner or clinical pharmacist (such as prescriptions, OTCs, herbal therapies and supplements) and documented in the medical record.    Rash        Patient Care Team:  Hugo Garzon DO as PCP - General (Internal Medicine)  Hugo Garzon DO     Review of Systems   Skin:  Positive for rash.   All other systems reviewed and are negative.      Objective   Vitals:  /80   Pulse 68   Ht 1.74 m (5' 8.5\")   Wt 109 kg (240 lb)   BMI 35.96 kg/m²       Physical Exam  Vitals and nursing note reviewed.   Constitutional:       General: He is not in acute distress.     Appearance: Normal appearance. He is well-developed. He is obese. He is not toxic-appearing.   HENT:      Head: Normocephalic and atraumatic.      Right Ear: Tympanic membrane and external ear normal.      Left Ear: Tympanic membrane and external ear normal.      Nose: Nose normal.      Mouth/Throat:      Mouth: Mucous membranes are moist.      Pharynx: Oropharynx is clear. No oropharyngeal exudate or posterior oropharyngeal erythema.      Tonsils: No tonsillar exudate. 2+ on the right. 2+ on the left.   Eyes:      Extraocular Movements: Extraocular movements intact.      Conjunctiva/sclera: Conjunctivae normal.   Cardiovascular:      Rate and Rhythm: Normal rate and regular rhythm.      Pulses: Normal pulses.      Heart sounds: Normal heart sounds. No murmur heard.  Pulmonary:      Effort: Pulmonary effort is normal.      Breath sounds: Normal breath sounds.   Abdominal:      General: Abdomen is flat. Bowel sounds are normal.      Palpations: Abdomen is soft.   Musculoskeletal:      Cervical back: Neck supple.   Feet:      Right foot:      Skin integrity: Skin integrity normal. No ulcer, blister, skin breakdown, erythema, warmth " or callus.      Toenail Condition: Right toenails are normal.      Left foot:      Skin integrity: Skin integrity normal. No ulcer, blister, skin breakdown, erythema, warmth or callus.      Toenail Condition: Left toenails are normal.   Lymphadenopathy:      Cervical: No cervical adenopathy.   Skin:     General: Skin is warm and dry.      Capillary Refill: Capillary refill takes more than 3 seconds.      Findings: Rash present.   Neurological:      Mental Status: He is alert. Mental status is at baseline.      Sensory: Sensation is intact.   Psychiatric:         Mood and Affect: Mood normal.         Behavior: Behavior normal.         Thought Content: Thought content normal.         Judgment: Judgment normal.         Assessment/Plan   Problem List Items Addressed This Visit       Class 2 severe obesity due to excess calories with serious comorbidity and body mass index (BMI) of 35.0 to 35.9 in adult (Multi)    Current Assessment & Plan     Continue to work on risk factor modification to improve overall weight control         Paroxysmal atrial fibrillation (Multi)    Current Assessment & Plan     Rate and rhythm controlled at this time on nifedipine ER 30 mg twice a day and bisoprolol 10 mg daily patient does not wish to change in therapy         Peripheral vascular disease (CMS-[facility])    Current Assessment & Plan     Denies symptomatic claudication at this time continue aspirin 81 mg daily         Type 2 diabetes mellitus with hyperglycemia, without long-term current use of insulin (Multi)    Current Assessment & Plan     Suboptimal blood sugar control at 7.8 patient refuses treatment at this time working on diet and lifestyle changes         Relevant Medications    alcohol swabs pads, medicated    blood sugar diagnostic (OneTouch Ultra Test) strip    lancets (OneTouch Delica Plus Lancet) 33 gauge misc    terbinafine (LamISIL) 250 mg tablet    terbinafine (LamISIL) 1 % cream    Other Relevant Orders    Follow Up In  [facility] - PCP - Established    Comprehensive Metabolic Panel    Hemoglobin A1C    Lipid Panel    Albumin-Creatinine Ratio, Urine Random    Elevated prostate specific antigen (PSA)    Current Assessment & Plan     Follows with urologist stable         Dyslipidemia associated with type 2 diabetes mellitus (Multi)    Relevant Medications    terbinafine (LamISIL) 250 mg tablet    terbinafine (LamISIL) 1 % cream    Other Relevant Orders    Follow Up In [facility] - PCP - Established    Comprehensive Metabolic Panel    Hemoglobin A1C    Lipid Panel    Albumin-Creatinine Ratio, Urine Random    Nonalcoholic steatohepatitis (SCALES)    Current Assessment & Plan     Continue work diligently on improving diet and exercise and reducing BMI         Tinea cruris - Primary    Current Assessment & Plan     Unresponsive the clotrimazole cream will try terfenadine cream twice a day keep area dry with cornstarch powder and oral terfenadine at 250 mg 1 tablet daily for 2 weeks and reevaluate         Relevant Medications    terbinafine (LamISIL) 250 mg tablet    terbinafine (LamISIL) 1 % cream    Other Relevant Orders    Follow Up In [facility] - PCP - Established     Other Visit Diagnoses       Rash        Relevant Medications    alcohol swabs pads, medicated

## 2024-06-27 NOTE — ASSESSMENT & PLAN NOTE
Unresponsive the clotrimazole cream will try terfenadine cream twice a day keep area dry with cornstarch powder and oral terfenadine at 250 mg 1 tablet daily for 2 weeks and reevaluate

## 2024-06-27 NOTE — PROGRESS NOTES
"Subjective   Patient ID: Joey Jean is a 59 y.o. male who presents for Follow-up and Rash (Went to urgent was told it was jock itch was given Clotrimazole cream has not seen any change. ).    HPI     Review of Systems    Objective   /80   Pulse 68   Ht 1.74 m (5' 8.5\")   Wt 109 kg (240 lb)   BMI 35.96 kg/m²     Physical Exam    Assessment/Plan          "

## 2024-07-19 ENCOUNTER — TELEPHONE (OUTPATIENT)
Dept: PRIMARY CARE | Facility: CLINIC | Age: 59
End: 2024-07-19
Payer: MEDICAID

## 2024-07-19 NOTE — TELEPHONE ENCOUNTER
Patient says jock itch/ yeast infection still hasn't resolved asking if pcp wants patiient to continue to take medication. Rash has gone down but the bumps still remain       terbinafine (LamISIL) 1 % cream [[id number]]   terbinafine (LamISIL) 250 mg tablet [[id number]]

## 2024-09-30 ENCOUNTER — APPOINTMENT (OUTPATIENT)
Dept: PRIMARY CARE | Facility: CLINIC | Age: 59
End: 2024-09-30
Payer: MEDICAID

## 2024-09-30 VITALS
SYSTOLIC BLOOD PRESSURE: 113 MMHG | WEIGHT: 243 LBS | BODY MASS INDEX: 35.99 KG/M2 | HEIGHT: 69 IN | HEART RATE: 100 BPM | DIASTOLIC BLOOD PRESSURE: 79 MMHG

## 2024-09-30 DIAGNOSIS — I48.0 PAROXYSMAL ATRIAL FIBRILLATION (MULTI): ICD-10-CM

## 2024-09-30 DIAGNOSIS — E66.812 CLASS 2 SEVERE OBESITY DUE TO EXCESS CALORIES WITH SERIOUS COMORBIDITY AND BODY MASS INDEX (BMI) OF 36.0 TO 36.9 IN ADULT: ICD-10-CM

## 2024-09-30 DIAGNOSIS — I10 BENIGN ESSENTIAL HYPERTENSION: ICD-10-CM

## 2024-09-30 DIAGNOSIS — E66.01 CLASS 2 SEVERE OBESITY DUE TO EXCESS CALORIES WITH SERIOUS COMORBIDITY AND BODY MASS INDEX (BMI) OF 36.0 TO 36.9 IN ADULT: ICD-10-CM

## 2024-09-30 DIAGNOSIS — I73.9 PERIPHERAL VASCULAR DISEASE (CMS-HCC): ICD-10-CM

## 2024-09-30 DIAGNOSIS — E78.5 DYSLIPIDEMIA ASSOCIATED WITH TYPE 2 DIABETES MELLITUS (MULTI): ICD-10-CM

## 2024-09-30 DIAGNOSIS — E11.69 DYSLIPIDEMIA ASSOCIATED WITH TYPE 2 DIABETES MELLITUS (MULTI): ICD-10-CM

## 2024-09-30 DIAGNOSIS — E11.65 TYPE 2 DIABETES MELLITUS WITH HYPERGLYCEMIA, WITHOUT LONG-TERM CURRENT USE OF INSULIN: ICD-10-CM

## 2024-09-30 DIAGNOSIS — J01.01 ACUTE RECURRENT MAXILLARY SINUSITIS: Primary | ICD-10-CM

## 2024-09-30 DIAGNOSIS — B35.6 TINEA CRURIS: ICD-10-CM

## 2024-09-30 PROCEDURE — 3062F POS MACROALBUMINURIA REV: CPT | Performed by: INTERNAL MEDICINE

## 2024-09-30 PROCEDURE — 3078F DIAST BP <80 MM HG: CPT | Performed by: INTERNAL MEDICINE

## 2024-09-30 PROCEDURE — 3008F BODY MASS INDEX DOCD: CPT | Performed by: INTERNAL MEDICINE

## 2024-09-30 PROCEDURE — 1036F TOBACCO NON-USER: CPT | Performed by: INTERNAL MEDICINE

## 2024-09-30 PROCEDURE — 3050F LDL-C >= 130 MG/DL: CPT | Performed by: INTERNAL MEDICINE

## 2024-09-30 PROCEDURE — 3051F HG A1C>EQUAL 7.0%<8.0%: CPT | Performed by: INTERNAL MEDICINE

## 2024-09-30 PROCEDURE — 3074F SYST BP LT 130 MM HG: CPT | Performed by: INTERNAL MEDICINE

## 2024-09-30 PROCEDURE — 99213 OFFICE O/P EST LOW 20 MIN: CPT | Performed by: INTERNAL MEDICINE

## 2024-09-30 RX ORDER — NIFEDIPINE 30 MG/1
30 TABLET, FILM COATED, EXTENDED RELEASE ORAL 2 TIMES DAILY
Qty: 180 TABLET | Refills: 3 | Status: SHIPPED | OUTPATIENT
Start: 2024-09-30 | End: 2025-09-30

## 2024-09-30 RX ORDER — AZITHROMYCIN 250 MG/1
TABLET, FILM COATED ORAL
Qty: 6 TABLET | Refills: 0 | Status: SHIPPED | OUTPATIENT
Start: 2024-09-30 | End: 2024-10-05

## 2024-09-30 RX ORDER — BISOPROLOL FUMARATE 10 MG/1
10 TABLET, FILM COATED ORAL DAILY
Qty: 90 TABLET | Refills: 3 | Status: SHIPPED | OUTPATIENT
Start: 2024-09-30

## 2024-09-30 NOTE — ASSESSMENT & PLAN NOTE
Reevaluate lipid profile patient difficulty in tolerating statin therapy but high risk  Orders:    Follow Up In [facility] - PCP - Established    Follow Up In [facility] - PCP - Established; Future

## 2024-09-30 NOTE — PROGRESS NOTES
"Subjective   Reason for Visit: Joey Jean is an 59 y.o. male here for a fu visit.     Past Medical, Surgical, and Family History reviewed and updated in chart.         Sinusitis  This is a recurrent problem. The current episode started 1 to 4 weeks ago. The problem has been gradually worsening since onset. There has been no fever. The fever has been present for Less than 1 day. His pain is at a severity of 5/10. The pain is mild. Associated symptoms include congestion, ear pain, headaches and sinus pressure. Past treatments include saline nose sprays and nasal decongestants. The treatment provided no relief.       Patient Care Team:  Hugo Garzon DO as PCP - General (Internal Medicine)  Hugo Garzon DO     Review of Systems   HENT:  Positive for congestion, ear pain and sinus pressure.    Neurological:  Positive for headaches.   All other systems reviewed and are negative.      Objective   Vitals:  /79   Pulse 100   Ht 1.74 m (5' 8.5\")   Wt 110 kg (243 lb)   BMI 36.41 kg/m²       Physical Exam  Vitals and nursing note reviewed.   Constitutional:       General: He is not in acute distress.     Appearance: Normal appearance. He is well-developed. He is obese. He is not toxic-appearing.   HENT:      Head: Normocephalic and atraumatic.      Right Ear: Tympanic membrane and external ear normal.      Left Ear: Tympanic membrane and external ear normal.      Nose: Nose normal.      Mouth/Throat:      Mouth: Mucous membranes are moist.      Pharynx: Oropharynx is clear. No oropharyngeal exudate or posterior oropharyngeal erythema.      Tonsils: No tonsillar exudate. 2+ on the right. 2+ on the left.   Eyes:      Extraocular Movements: Extraocular movements intact.      Conjunctiva/sclera: Conjunctivae normal.   Cardiovascular:      Rate and Rhythm: Regular rhythm.      Pulses: Normal pulses.      Heart sounds: Normal heart sounds. No murmur heard.  Pulmonary:      Effort: Pulmonary effort is " normal.      Breath sounds: Normal breath sounds.   Abdominal:      General: Abdomen is flat. Bowel sounds are normal.      Palpations: Abdomen is soft.   Musculoskeletal:      Cervical back: Neck supple.   Feet:      Right foot:      Skin integrity: Skin integrity normal. No ulcer, blister, skin breakdown, erythema, warmth or callus.      Toenail Condition: Right toenails are normal.      Left foot:      Skin integrity: Skin integrity normal. No ulcer, blister, skin breakdown, erythema, warmth or callus.      Toenail Condition: Left toenails are normal.   Lymphadenopathy:      Cervical: No cervical adenopathy.   Skin:     General: Skin is warm and dry.      Capillary Refill: Capillary refill takes more than 3 seconds.      Findings: No rash.   Neurological:      Mental Status: He is alert. Mental status is at baseline.      Sensory: Sensation is intact.   Psychiatric:         Mood and Affect: Mood normal.         Behavior: Behavior normal.         Thought Content: Thought content normal.         Judgment: Judgment normal.     Lifestyle Recommendations  I recommend a whole-food plant-based diet, an eating pattern that encourages the consumption of unrefined plant foods (such as fruits, vegetables, tubers, whole grains, legumes, nuts and seeds) and discourages meats, dairy products, eggs and processed foods.     The AHA/ACC recommends that the patient consume a dietary pattern that emphasizes intake of vegetables, fruits, and whole grains; includes low-fat dairy products, poultry, fish, legumes, non-tropical vegetable oils, and nuts; and limits intake of sodium, sweets, sugar-sweetened beverages, and red meats.  Adapt this dietary pattern to appropriate calorie requirements (a 500-750 kcal/day deficit to loose weight), personal and cultural food preferences, and nutrition therapy for other medical conditions (including diabetes).  Achieve this pattern by following plans such as the [place] Mediterranean, DASH dietary  pattern, or AHA diet.     Engage in 2 hours and 30 minutes per week of moderate-intensity physical activity, or 1 hour and 15 minutes (75 minutes) per week of vigorous-intensity aerobic physical activity, or an equivalent combination of moderate and vigorous-intensity aerobic physical activity. Aerobic activity should be performed in episodes of at least 10 minutes preferably spread throughout the week.     Adhering to a heart healthy diet, regular exercise habits, avoidance of tobacco products, and maintenance of a healthy weight are crucial components of their heart disease risk reduction.    Assessment & Plan  Type 2 diabetes mellitus with hyperglycemia, without long-term current use of insulin  Reevaluate hemoglobin A1c with blood work  Orders:    Follow Up In [facility] - PCP - Established    Follow Up In [facility] - PCP - Established; Future    Tinea cruris  Continue ketoconazole shampoo  Orders:    Follow Up In [facility] - PCP - Established    Dyslipidemia associated with type 2 diabetes mellitus (Multi)  Reevaluate lipid profile patient difficulty in tolerating statin therapy but high risk  Orders:    Follow Up In [facility] - PCP - Established    Follow Up In [facility] - PCP - Established; Future    Benign essential hypertension  Blood pressure stable refilled bisoprolol and nifedipine ER  Orders:    bisoprolol (Zebeta) 10 mg tablet; Take 1 tablet (10 mg) by mouth once daily.    NIFEdipine ER (Adalat CC) 30 mg 24 hr tablet; Take 1 tablet (30 mg) by mouth 2 times a day. Do not crush, chew, or split.    Acute recurrent maxillary sinusitis  Zithromax as tolerated patient with a history of recurrence aggravated by perennial allergic rhinitis continue saline rinses  Orders:    azithromycin (Zithromax) 250 mg tablet; Take 2 tablets (500 mg) by mouth once daily for 1 day, THEN 1 tablet (250 mg) once daily for 4 days. Take 2 tabs (500 mg) by mouth today,  than 1 daily for 4 days..    Peripheral vascular disease (CMS-HCC)  No signs or symptoms of claudication at this time continue to monitor carotid artery stenosis and optimize medical therapy       Paroxysmal atrial fibrillation (Multi)  Rate and rhythm controlled at this time continues on baby aspirin follows with cardiologist should be on chronic anticoagulation but patient history of adherence issues to medication therapy and expensive medication prohibitive       Class 2 severe obesity due to excess calories with serious comorbidity and body mass index (BMI) of 36.0 to 36.9 in adult  Continue encouragement of dietary changes to improve weight loss particularly in the setting of SCALES

## 2024-09-30 NOTE — ASSESSMENT & PLAN NOTE
Continue ketoconazole shampoo  Orders:    Follow Up In [facility] - PCP - Established

## 2024-09-30 NOTE — ASSESSMENT & PLAN NOTE
Reevaluate hemoglobin A1c with blood work  Orders:    Follow Up [facility] - PCP - Established    Follow Up In [facility] - PCP - Established; Future

## 2024-09-30 NOTE — PROGRESS NOTES
"Subjective   Patient ID: Joey Jean is a 59 y.o. male who presents for Follow-up and Headache (Right side headache, sinus pain, ).    HPI     Review of Systems    Objective   /79   Pulse 100   Ht 1.74 m (5' 8.5\")   Wt 110 kg (243 lb)   BMI 36.41 kg/m²     Physical Exam    Assessment/Plan          "

## 2024-10-01 PROBLEM — I95.1 ORTHOSTATIC HYPOTENSION: Status: RESOLVED | Noted: 2023-11-10 | Resolved: 2024-10-01

## 2024-10-01 ASSESSMENT — ENCOUNTER SYMPTOMS
HEADACHES: 1
SINUS PRESSURE: 1

## 2024-10-01 NOTE — ASSESSMENT & PLAN NOTE
Continue encouragement of dietary changes to improve weight loss particularly in the setting of SCALES

## 2024-10-01 NOTE — ASSESSMENT & PLAN NOTE
No signs or symptoms of claudication at this time continue to monitor carotid artery stenosis and optimize medical therapy

## 2024-10-01 NOTE — ASSESSMENT & PLAN NOTE
Rate and rhythm controlled at this time continues on baby aspirin follows with cardiologist should be on chronic anticoagulation but patient history of adherence issues to medication therapy and expensive medication prohibitive

## 2024-11-18 ENCOUNTER — APPOINTMENT (OUTPATIENT)
Dept: CARDIOLOGY | Facility: CLINIC | Age: 59
End: 2024-11-18
Payer: MEDICAID

## 2024-11-18 VITALS
SYSTOLIC BLOOD PRESSURE: 124 MMHG | HEART RATE: 77 BPM | HEIGHT: 69 IN | DIASTOLIC BLOOD PRESSURE: 78 MMHG | BODY MASS INDEX: 35.99 KG/M2 | OXYGEN SATURATION: 96 % | WEIGHT: 243 LBS

## 2024-11-18 DIAGNOSIS — I10 BENIGN ESSENTIAL HYPERTENSION: Primary | ICD-10-CM

## 2024-11-18 DIAGNOSIS — I48.0 PAROXYSMAL ATRIAL FIBRILLATION (MULTI): ICD-10-CM

## 2024-11-18 PROCEDURE — 1036F TOBACCO NON-USER: CPT | Performed by: PHYSICIAN ASSISTANT

## 2024-11-18 PROCEDURE — 3062F POS MACROALBUMINURIA REV: CPT | Performed by: PHYSICIAN ASSISTANT

## 2024-11-18 PROCEDURE — 3050F LDL-C >= 130 MG/DL: CPT | Performed by: PHYSICIAN ASSISTANT

## 2024-11-18 PROCEDURE — 99213 OFFICE O/P EST LOW 20 MIN: CPT | Performed by: PHYSICIAN ASSISTANT

## 2024-11-18 PROCEDURE — 3074F SYST BP LT 130 MM HG: CPT | Performed by: PHYSICIAN ASSISTANT

## 2024-11-18 PROCEDURE — 3008F BODY MASS INDEX DOCD: CPT | Performed by: PHYSICIAN ASSISTANT

## 2024-11-18 PROCEDURE — 3051F HG A1C>EQUAL 7.0%<8.0%: CPT | Performed by: PHYSICIAN ASSISTANT

## 2024-11-18 PROCEDURE — 3078F DIAST BP <80 MM HG: CPT | Performed by: PHYSICIAN ASSISTANT

## 2024-11-18 ASSESSMENT — ENCOUNTER SYMPTOMS
PALPITATIONS: 0
WEAKNESS: 0
ABDOMINAL PAIN: 0
ORTHOPNEA: 0
SHORTNESS OF BREATH: 0
FEVER: 0
DYSURIA: 0
DIARRHEA: 0
NAUSEA: 0
WHEEZING: 0
VOMITING: 0

## 2024-11-18 NOTE — PATIENT INSTRUCTIONS
Please continue your current medications.  If you have any change in your cardiorespiratory status please call the office right away.  Please keep your yearly appointment with Dr. Ruiz next May.

## 2024-11-18 NOTE — PROGRESS NOTES
Cardiology Follow Up  Chief Complaint:   Patient is here for 6 month office visit.      History Of Present Illness:    Joey Jean is a 59 y.o. male presenting for annual follow-up.     He has a history of paroxysmal atrial fibrillation complicated by stroke in the past who presents to our clinic for his yearly cardiovascular follow-up. The patient states that from the perspective of cardiovascular disease things are doing stable he denies having any palpitations, shortness of breath, worsening dyspnea on effort. He was recently diagnosed with fatty liver due to a right upper quadrant abdominal pain and he was told that he has SCALES. The patient admits that he eats lots of processed and fast food which could be contributing to his fatty liver. He denies drinking alcohol following his diagnosis with atrial fibrillation. He has quit smoking. He has hyperlipidemia, diabetes, uncontrolled HTN. Has back pain and cant exercise. Gets left sides chest pain when he uses the left arm going on for years.  Reviewed chart, had a episode of rapid A-fib after not taking bisoprolol converted spontaneously to sinus rhythm.  Went and got a second opinion from Dr. Munoz.  Tells me he checks his blood pressure at home with the machine that talks to him and his blood pressure is reportedly normal.  11-18-24: This is a 59-year-old patient known to Dr. Ruiz.  He presents for 6-month follow-up.  In the last 6 months he does have complaints of musculoskeletal chest discomfort especially with certain positions or movements with his left upper extremity.  The episodes of this discomfort are not exertional in nature and there are no associated symptoms and once he has change in position or movement of the arms the discomfort improves.  Blood pressures have been controlled.  No symptoms of A-fib.  He is not smoking and taking his medication as prescribed and voices no other new cardiac complaints or concerns.     Last Recorded  "Vitals:  Vitals:    11/18/24 1423   BP: 124/78   BP Location: Left arm   Patient Position: Sitting   BP Cuff Size: Adult   Pulse: 77   SpO2: 96%   Weight: 110 kg (243 lb)   Height: 1.74 m (5' 8.5\")       Past Medical History:  He has a past medical history of Abnormal findings on diagnostic imaging of skull and head, not elsewhere classified (06/03/2020), Acute maxillary sinusitis, unspecified (04/08/2021), Acute maxillary sinusitis, unspecified (03/08/2021), Anal fissure, unspecified, Cervicalgia (12/04/2020), Impacted cerumen, bilateral (04/08/2021), Localized edema (06/23/2020), Occipital neuralgia (07/30/2020), Other intervertebral disc degeneration, lumbar region, Other specified abnormal findings of blood chemistry (06/14/2021), Other specified anxiety disorders (03/22/2021), Other specified disorders of adrenal gland (05/25/2021), Panic disorder (episodic paroxysmal anxiety) (04/24/2020), Personal history of diseases of the skin and subcutaneous tissue (01/21/2021), Personal history of other diseases of male genital organs (11/18/2019), Personal history of other diseases of the digestive system, Personal history of other diseases of the digestive system, Personal history of other diseases of the musculoskeletal system and connective tissue, Personal history of other diseases of the musculoskeletal system and connective tissue (05/28/2021), Personal history of other diseases of the musculoskeletal system and connective tissue (11/10/2019), Personal history of other diseases of the musculoskeletal system and connective tissue, Personal history of other diseases of the respiratory system (10/31/2020), Personal history of other diseases of the respiratory system (10/31/2019), Personal history of other diseases of the respiratory system, Personal history of other diseases of the respiratory system (01/22/2021), Personal history of other diseases of urinary system (10/28/2019), Personal history of other endocrine, " nutritional and metabolic disease (02/01/2021), Personal history of other specified conditions (12/23/2020), Personal history of other specified conditions (01/04/2022), Personal history of other specified conditions (01/24/2020), Personal history of other specified conditions (12/31/2019), Personal history of other specified conditions (10/24/2019), Personal history of transient ischemic attack (TIA), and cerebral infarction without residual deficits, Personal history of traumatic brain injury, Pleurodynia (03/22/2021), Radiculopathy, cervical region (11/10/2019), Right upper quadrant pain (01/21/2021), and Tobacco use.    Past Surgical History:  He has no past surgical history on file.      Social History:  He reports that he has quit smoking. His smoking use included cigarettes. He has never used smokeless tobacco. He reports that he does not drink alcohol and does not use drugs.    Family History:  Family History   Problem Relation Name Age of Onset    Other (cva) Other      Heart failure Other      Coronary artery disease Other      Migraines Other          Allergies:  Celecoxib, Penicillin v potassium, Penicillins, Sulfa (sulfonamide antibiotics), Sulfamethoxazole-trimethoprim, Topiramate, Trazodone, Atorvastatin, Grass pollen, House dust mite, Lisinopril, Losartan, Mold, Pitavastatin, Pravastatin, Ragweed, and Statins-hmg-coa reductase inhibitors    Outpatient Medications:  Current Outpatient Medications   Medication Instructions    alcohol swabs pads, medicated Use to clean area before checking blood sugar    aspirin 81 mg EC tablet 1 tablet, Daily    bisoprolol (ZEBETA) 10 mg, oral, Daily    blood sugar diagnostic (OneTouch Ultra Test) strip 1 strip, miscellaneous, 2 times daily    cholecalciferol (Vitamin D-3) 125 MCG (5000 UT) capsule Take by mouth.    cyclobenzaprine (FLEXERIL) 10 mg, 3 times daily PRN    dexlansoprazole (Dexilant) 30 mg DR capsule 1 capsule, Daily    fluorometholone (FML) 0.1 %  ophthalmic suspension SHAKE LIQUID AND INSTILL 1 DROP IN RIGHT EYE FOUR TIMES DAILY    ketoconazole (NIZOral) 2 % shampoo Use 2 times a week as needed    lancets (OneTouch Delica Plus Lancet) 33 gauge misc USE TO CHECK BLOOD GLUCOSE LEVELS TWO TIMES DAILY    loratadine (Claritin) 10 mg tablet 1 tablet, Daily    NIFEdipine ER (ADALAT CC) 30 mg, oral, 2 times daily, Do not crush, chew, or split.    olopatadine (Patanol) 0.1 % ophthalmic solution 1 drop, Left Eye, 2 times daily PRN    OneTouch Ultra2 Meter misc use as directed    OxyCONTIN 15 mg, Every 12 hours    Saline NasaL 0.65 % nasal spray 2 sprays, 2 times daily     Review of Systems   Constitutional: Negative for fever and malaise/fatigue.   Cardiovascular:  Negative for chest pain, orthopnea and palpitations.        Musculoskeletal pain in chest at times   Respiratory:  Negative for shortness of breath and wheezing.    Skin:  Negative for itching and rash.   Gastrointestinal:  Negative for abdominal pain, diarrhea, nausea and vomiting.   Genitourinary:  Negative for dysuria.   Neurological:  Negative for weakness.      Physical Exam  Constitutional:       General: He is not in acute distress.     Appearance: Normal appearance.   HENT:      Mouth/Throat:      Mouth: Mucous membranes are moist.   Neck:      Comments: No JVD  Cardiovascular:      Rate and Rhythm: Normal rate and regular rhythm.      Heart sounds: Normal heart sounds. No murmur heard.  Pulmonary:      Effort: Pulmonary effort is normal.      Breath sounds: Normal breath sounds.   Abdominal:      General: Abdomen is flat. Bowel sounds are normal.      Palpations: Abdomen is soft.   Musculoskeletal:         General: No swelling.   Skin:     General: Skin is warm and dry.   Neurological:      Mental Status: He is alert and oriented to person, place, and time.   Psychiatric:         Mood and Affect: Mood normal.           Last Labs:  CBC -  Lab Results   Component Value Date    WBC 10.7 08/14/2023     HGB 16.2 08/14/2023    HCT 47.2 08/14/2023    MCV 87 08/14/2023     08/14/2023       CMP -  Lab Results   Component Value Date    CALCIUM 9.5 03/27/2024    PHOS 2.9 08/14/2023    PROT 7.3 03/27/2024    ALBUMIN 4.3 03/27/2024    AST 17 03/27/2024    ALT 25 03/27/2024    ALKPHOS 68 03/27/2024    BILITOT 0.8 03/27/2024       LIPID PANEL -   Lab Results   Component Value Date    CHOL 253 (H) 03/27/2024    TRIG 164 (H) 03/27/2024    HDL 46.8 03/27/2024    CHHDL 5.4 03/27/2024    LDLF 156 (H) 04/28/2023    VLDL 33 03/27/2024    NHDL 206 (H) 03/27/2024       RENAL FUNCTION PANEL -   Lab Results   Component Value Date    GLUCOSE 163 (H) 03/27/2024     (L) 03/27/2024    K 4.1 03/27/2024     03/27/2024    CO2 23 03/27/2024    ANIONGAP 14 03/27/2024    BUN 19 03/27/2024    CREATININE 0.95 03/27/2024    GFRMALE 72 08/14/2023    CALCIUM 9.5 03/27/2024    PHOS 2.9 08/14/2023    ALBUMIN 4.3 03/27/2024        Lab Results   Component Value Date    BNP 42 08/14/2023    HGBA1C 7.8 (H) 03/27/2024       Last Cardiology Tests:    Echo:  CONCLUSIONS:   1. The left ventricular systolic function is normal with a 55-60% estimated ejection fraction.    Stress Test:  Nuclear Stress Test --IMPRESSION:  1. Normal perfusion without evidence of ischemia or prior infarct     2. Calculated ejection fraction is 64% without segmental wall motion  abnormality.      No recent results to review.    Assessment/Plan   Problem List Items Addressed This Visit             ICD-10-CM       Cardiac and Vasculature    Benign essential hypertension - Primary I10    Paroxysmal atrial fibrillation (Multi) I48.0     Atypical chest discomfort--stress testing last year showed normal perfusion.  Again his symptoms are not exertional and seem to be more so musculoskeletal as they are induced by certain positions or movements of the upper extremities and resolved by change of position or further movements.  Patient is known to have significant cervical  and thoracic disc disease that could be also causing some of his symptoms.  Again with normal activities he does not notice the pain and is not having shortness of breath or any other concerning cardiac symptoms.  Hypertension--blood pressure is well-controlled.  History of PAF--has not had any palpitations or recurrent symptoms of A-fib.  He is on aspirin and beta-blocker therapy.  Overall patient doing well from a cardiac standpoint.  Again symptoms seem more so musculoskeletal but if he has any change in cardiorespiratory status he is instructed to contact the office.    Nick Crane PA-C  11/18/2024  2:30 PM

## 2024-12-09 ENCOUNTER — TELEPHONE (OUTPATIENT)
Dept: PRIMARY CARE | Facility: CLINIC | Age: 59
End: 2024-12-09
Payer: MEDICAID

## 2024-12-09 DIAGNOSIS — E11.65 TYPE 2 DIABETES MELLITUS WITH HYPERGLYCEMIA, WITHOUT LONG-TERM CURRENT USE OF INSULIN: ICD-10-CM

## 2024-12-09 DIAGNOSIS — R21 RASH: ICD-10-CM

## 2024-12-09 RX ORDER — BLOOD SUGAR DIAGNOSTIC
1 STRIP MISCELLANEOUS 2 TIMES DAILY
Qty: 100 STRIP | Refills: 3 | Status: SHIPPED | OUTPATIENT
Start: 2024-12-09

## 2024-12-09 RX ORDER — LANCETS 33 GAUGE
EACH MISCELLANEOUS
Qty: 100 EACH | Refills: 2 | Status: SHIPPED | OUTPATIENT
Start: 2024-12-09

## 2024-12-09 RX ORDER — ISOPROPYL ALCOHOL 70 ML/100ML
SWAB TOPICAL
Qty: 100 EACH | Refills: 3 | Status: SHIPPED | OUTPATIENT
Start: 2024-12-09

## 2024-12-09 NOTE — TELEPHONE ENCOUNTER
Med Refill   alcohol swabs pads, medicated [[id number]]   blood sugar diagnostic (OneTouch Ultra Test) strip [[id number]]   lancets (OneTouch Delica Plus Lancet) 33 gauge misc [[id number]]      [id number] - [address] AT SEC OF [address] [address]  Phone: [phone]  Fax: [phone]  VIDHYA #: [id number]

## 2024-12-09 NOTE — TELEPHONE ENCOUNTER
Med Refill   alcohol swabs pads, medicated [[id number]]   blood sugar diagnostic (OneTouch Ultra Test) strip [[id number]]   lancets (OneTouch Delica Plus Lancet) 33 gauge misc [[id number]]     [facility][id number] - [address] AT [address] OF [address] [address]  Phone: [phone]  Fax: [phone]  VIDHYA #: [id number]

## 2025-01-27 DIAGNOSIS — B35.6 TINEA CRURIS: ICD-10-CM

## 2025-01-27 RX ORDER — TERBINAFINE HYDROCHLORIDE 250 MG/1
250 TABLET ORAL DAILY
Qty: 14 TABLET | Refills: 0 | Status: SHIPPED | OUTPATIENT
Start: 2025-01-27 | End: 2025-02-10

## 2025-01-27 NOTE — TELEPHONE ENCOUNTER
Patient called in for a refill  terbinafine (LamISIL) 1 % cream [[id number]]      Order Details  Dose: -- Route: Topical Frequency: 2 times daily   Dispense Quantity: 30 g Refills: 1          Sig: Apply topically 2 times a day.         Start Date: 24 End Date: 24 after 60 doses   Written Date: 24 Rx Expiration Date: 25        terbinafine (LamISIL) 250 mg tablet [[id number]]      Order Details  Dose: 250 mg Route: oral Frequency: Daily   Dispense Quantity: 14 tablet Refills: 0          Sig: Take 1 tablet (250 mg) by mouth once daily for 14 days.         Start Date: 24 End Date: 24 after 14 doses   Written Date: 24 Rx Expiration Date: 25        Pharmacy    [facility] #[id number] - [place], [place] - [address] [facility] AT [address] OF [address]   Sched

## 2025-04-02 ENCOUNTER — APPOINTMENT (OUTPATIENT)
Dept: PRIMARY CARE | Facility: CLINIC | Age: 60
End: 2025-04-02
Payer: MEDICAID

## 2025-05-05 ENCOUNTER — TELEPHONE (OUTPATIENT)
Dept: PRIMARY CARE | Facility: CLINIC | Age: 60
End: 2025-05-05
Payer: MEDICAID

## 2025-05-05 DIAGNOSIS — E11.65 TYPE 2 DIABETES MELLITUS WITH HYPERGLYCEMIA, WITHOUT LONG-TERM CURRENT USE OF INSULIN: ICD-10-CM

## 2025-05-05 DIAGNOSIS — R21 RASH: ICD-10-CM

## 2025-05-05 RX ORDER — BLOOD SUGAR DIAGNOSTIC
1 STRIP MISCELLANEOUS 2 TIMES DAILY
Qty: 100 STRIP | Refills: 3 | Status: SHIPPED | OUTPATIENT
Start: 2025-05-05

## 2025-05-05 RX ORDER — ISOPROPYL ALCOHOL 70 ML/100ML
SWAB TOPICAL
Qty: 100 EACH | Refills: 3 | Status: SHIPPED | OUTPATIENT
Start: 2025-05-05

## 2025-05-05 RX ORDER — LANCETS 33 GAUGE
EACH MISCELLANEOUS
Qty: 100 EACH | Refills: 2 | Status: SHIPPED | OUTPATIENT
Start: 2025-05-05

## 2025-05-05 RX ORDER — DEXTROSE 4 G
1 TABLET,CHEWABLE ORAL 3 TIMES DAILY
Qty: 1 EACH | Refills: 3 | Status: SHIPPED | OUTPATIENT
Start: 2025-05-05 | End: 2026-05-05

## 2025-05-05 NOTE — TELEPHONE ENCOUNTER
Med Refill   blood sugar diagnostic (OneTouch Ultra Test) strip [[id number]]   lancets (OneTouch Delica Plus Lancet) 33 gauge misc [[id number]]   alcohol swabs pads, medicated [[id number]]   Glucometer- Truemetrix air     Patient called and said that the insurance wants to change his glucometer to a truemetrix air by 6/1/25 please advise.

## 2025-05-13 ENCOUNTER — DOCUMENTATION (OUTPATIENT)
Dept: CARDIOLOGY | Facility: HOSPITAL | Age: 60
End: 2025-05-13
Payer: MEDICAID

## 2025-05-13 ENCOUNTER — TELEPHONE (OUTPATIENT)
Dept: PRIMARY CARE | Facility: CLINIC | Age: 60
End: 2025-05-13
Payer: MEDICAID

## 2025-05-13 DIAGNOSIS — R97.20 ELEVATED PROSTATE SPECIFIC ANTIGEN (PSA): Primary | ICD-10-CM

## 2025-05-13 NOTE — PROGRESS NOTES
Patient returned call and stated he was not sure if he would be able to get his labs done before his appointment with Dr. Ruiz. Reviewed date and time of his upcoming appointment with Dr. Ruiz.

## 2025-05-20 ENCOUNTER — TELEPHONE (OUTPATIENT)
Dept: CARDIOLOGY | Facility: HOSPITAL | Age: 60
End: 2025-05-20
Payer: MEDICAID

## 2025-05-22 ENCOUNTER — APPOINTMENT (OUTPATIENT)
Dept: CARDIOLOGY | Facility: CLINIC | Age: 60
End: 2025-05-22
Payer: MEDICAID

## 2025-05-23 RX ORDER — CETIRIZINE HYDROCHLORIDE 10 MG/1
1 TABLET ORAL
COMMUNITY
Start: 2025-05-12

## 2025-06-06 ENCOUNTER — TELEPHONE (OUTPATIENT)
Dept: CARDIOLOGY | Facility: HOSPITAL | Age: 60
End: 2025-06-06
Payer: MEDICAID

## 2025-06-10 ENCOUNTER — APPOINTMENT (OUTPATIENT)
Dept: CARDIOLOGY | Facility: HOSPITAL | Age: 60
End: 2025-06-10
Payer: MEDICAID

## 2025-06-23 ENCOUNTER — TELEPHONE (OUTPATIENT)
Dept: PRIMARY CARE | Facility: CLINIC | Age: 60
End: 2025-06-23
Payer: MEDICAID

## 2025-06-23 DIAGNOSIS — E11.65 TYPE 2 DIABETES MELLITUS WITH HYPERGLYCEMIA, WITHOUT LONG-TERM CURRENT USE OF INSULIN: ICD-10-CM

## 2025-06-23 RX ORDER — DEXTROSE 4 G
TABLET,CHEWABLE ORAL
Qty: 1 EACH | Refills: 0 | Status: SHIPPED | OUTPATIENT
Start: 2025-06-23

## 2025-06-23 RX ORDER — ISOPROPYL ALCOHOL 70 ML/100ML
SWAB TOPICAL
Qty: 200 EACH | Refills: 11 | Status: SHIPPED | OUTPATIENT
Start: 2025-06-23

## 2025-06-23 RX ORDER — LANCETS
EACH MISCELLANEOUS
Qty: 100 EACH | Refills: 11 | Status: SHIPPED | OUTPATIENT
Start: 2025-06-23

## 2025-06-23 RX ORDER — BLOOD SUGAR DIAGNOSTIC
STRIP MISCELLANEOUS
Qty: 100 STRIP | Refills: 11 | Status: SHIPPED | OUTPATIENT
Start: 2025-06-23

## 2025-06-23 NOTE — TELEPHONE ENCOUNTER
Patients pharmacy says that Medicaid will no longer cover Onetouch products.  patient needs a new meter Accucheck or Truemetrix and all supplies including alcohol swabs.

## 2025-06-30 ENCOUNTER — APPOINTMENT (OUTPATIENT)
Dept: PRIMARY CARE | Facility: CLINIC | Age: 60
End: 2025-06-30
Payer: MEDICAID

## 2025-06-30 VITALS
BODY MASS INDEX: 33.21 KG/M2 | WEIGHT: 232 LBS | HEART RATE: 93 BPM | OXYGEN SATURATION: 98 % | HEIGHT: 70 IN | DIASTOLIC BLOOD PRESSURE: 90 MMHG | SYSTOLIC BLOOD PRESSURE: 150 MMHG

## 2025-06-30 DIAGNOSIS — E66.09 CLASS 1 OBESITY DUE TO EXCESS CALORIES WITH SERIOUS COMORBIDITY AND BODY MASS INDEX (BMI) OF 33.0 TO 33.9 IN ADULT: ICD-10-CM

## 2025-06-30 DIAGNOSIS — E11.69 DYSLIPIDEMIA ASSOCIATED WITH TYPE 2 DIABETES MELLITUS: ICD-10-CM

## 2025-06-30 DIAGNOSIS — E78.5 DYSLIPIDEMIA ASSOCIATED WITH TYPE 2 DIABETES MELLITUS: ICD-10-CM

## 2025-06-30 DIAGNOSIS — Z87.891 FORMER SMOKER: ICD-10-CM

## 2025-06-30 DIAGNOSIS — D35.01 ADENOMA OF RIGHT ADRENAL GLAND: ICD-10-CM

## 2025-06-30 DIAGNOSIS — I48.0 PAROXYSMAL ATRIAL FIBRILLATION (MULTI): ICD-10-CM

## 2025-06-30 DIAGNOSIS — E66.811 CLASS 1 OBESITY DUE TO EXCESS CALORIES WITH SERIOUS COMORBIDITY AND BODY MASS INDEX (BMI) OF 33.0 TO 33.9 IN ADULT: ICD-10-CM

## 2025-06-30 DIAGNOSIS — E11.65 TYPE 2 DIABETES MELLITUS WITH HYPERGLYCEMIA, WITHOUT LONG-TERM CURRENT USE OF INSULIN: Primary | ICD-10-CM

## 2025-06-30 DIAGNOSIS — F11.20 OPIOID DEPENDENCE WITH CURRENT USE (MULTI): ICD-10-CM

## 2025-06-30 DIAGNOSIS — R97.20 ELEVATED PROSTATE SPECIFIC ANTIGEN (PSA): ICD-10-CM

## 2025-06-30 PROBLEM — I69.30 SEQUELA OF CEREBROVASCULAR ACCIDENT: Status: RESOLVED | Noted: 2023-04-12 | Resolved: 2025-06-30

## 2025-06-30 PROBLEM — Z86.73 HISTORY OF CEREBROVASCULAR ACCIDENT: Status: RESOLVED | Noted: 2023-01-24 | Resolved: 2025-06-30

## 2025-06-30 PROCEDURE — 3080F DIAST BP >= 90 MM HG: CPT | Performed by: INTERNAL MEDICINE

## 2025-06-30 PROCEDURE — 99214 OFFICE O/P EST MOD 30 MIN: CPT | Performed by: INTERNAL MEDICINE

## 2025-06-30 PROCEDURE — 3008F BODY MASS INDEX DOCD: CPT | Performed by: INTERNAL MEDICINE

## 2025-06-30 PROCEDURE — 3077F SYST BP >= 140 MM HG: CPT | Performed by: INTERNAL MEDICINE

## 2025-06-30 PROCEDURE — 1036F TOBACCO NON-USER: CPT | Performed by: INTERNAL MEDICINE

## 2025-06-30 RX ORDER — AZITHROMYCIN 250 MG/1
TABLET, FILM COATED ORAL
COMMUNITY
Start: 2025-06-26 | End: 2025-06-30 | Stop reason: ALTCHOICE

## 2025-06-30 RX ORDER — MORPHINE SULFATE 15 MG/1
TABLET, FILM COATED, EXTENDED RELEASE ORAL
COMMUNITY
Start: 2025-06-10

## 2025-06-30 RX ORDER — OXYCODONE AND ACETAMINOPHEN 5; 325 MG/1; MG/1
TABLET ORAL
COMMUNITY
Start: 2025-06-10

## 2025-06-30 ASSESSMENT — PATIENT HEALTH QUESTIONNAIRE - PHQ9
SUM OF ALL RESPONSES TO PHQ9 QUESTIONS 1 AND 2: 2
1. LITTLE INTEREST OR PLEASURE IN DOING THINGS: SEVERAL DAYS
2. FEELING DOWN, DEPRESSED OR HOPELESS: SEVERAL DAYS

## 2025-06-30 ASSESSMENT — ENCOUNTER SYMPTOMS
DEPRESSION: 1
OCCASIONAL FEELINGS OF UNSTEADINESS: 0
LOSS OF SENSATION IN FEET: 0

## 2025-06-30 ASSESSMENT — COLUMBIA-SUICIDE SEVERITY RATING SCALE - C-SSRS
2. HAVE YOU ACTUALLY HAD ANY THOUGHTS OF KILLING YOURSELF?: NO
1. IN THE PAST MONTH, HAVE YOU WISHED YOU WERE DEAD OR WISHED YOU COULD GO TO SLEEP AND NOT WAKE UP?: NO
6. HAVE YOU EVER DONE ANYTHING, STARTED TO DO ANYTHING, OR PREPARED TO DO ANYTHING TO END YOUR LIFE?: NO

## 2025-06-30 NOTE — ASSESSMENT & PLAN NOTE
Continue to monitor with pain management specialist on controlled release morphine 15 mg twice a day with oxycodone for breakthrough

## 2025-06-30 NOTE — ASSESSMENT & PLAN NOTE
Concerning symptoms for polydipsia polyphagia polyuria with uncontrolled blood sugars with resistance to medication therapies refuses insulin therapy reevaluate blood work and reevaluate options for control and assess severity given recent weight loss of 12 pounds  Orders:    Follow Up In [facility] - PCP - Established    Comprehensive Metabolic Panel; Future    Hemoglobin A1C; Future    Lipid Panel; Future    Albumin-Creatinine Ratio, Urine Random; Future    CBC and Auto Differential; Future

## 2025-06-30 NOTE — PROGRESS NOTES
Subjective   Joey Jean is a 60 y.o. male who presents for Follow-up (Pt here for follow up).  HPI  This is a diabetes follow up visit for Joey Jean. The current treatment includes diet, exercise, and oral medications and he is noncompliant much of the time. Symptoms include polydipsia, polyuria, and numbness/tingling. Glucose is monitored: once daily, and average sugars are 200 - 300. he reports poor compliance with a low carbohydrate diet, and does not exercise.    Patient is not prescribed ACE/ARB/ARNI medication   Patient is not prescribed a STATIN medication  Patient is not prescribed a SGLT2/GLP1 medication    Last Flu Vaccine given:            10/01/2024   Last PCV Vaccine given:       Lab Results   Component Value Date    HGBA1C 7.8 (H) 03/27/2024    CREATININE 0.95 03/27/2024    MICROALBCREA 573.1 (H) 03/27/2024    LDLCALC 173 (H) 03/27/2024        Last Eye Exam: patient reports annual screening by optometry/opthalmology     Liver Screening: Computed FIB-4 Calculation unavailable. One or more values for this score either were not found within the given timeframe or did not fit some other criterion.    Interpretation: <1.45 Cirrhosis less likely, 1.45 - 3.25 Indeterminate, >3.25 Cirrhosis more likely    Review of Systems  Visit Vitals  /90 (BP Location: Left arm, Patient Position: Sitting)   Pulse 93   Body mass index is 33.29 kg/m².   Objective   Physical Exam  Vitals and nursing note reviewed.   Constitutional:       General: He is not in acute distress.     Appearance: Normal appearance. He is well-developed. He is not toxic-appearing.   HENT:      Head: Normocephalic and atraumatic.      Right Ear: Tympanic membrane and external ear normal.      Left Ear: Tympanic membrane and external ear normal.      Nose: Nose normal.      Mouth/Throat:      Mouth: Mucous membranes are moist.      Pharynx: Oropharynx is clear. No oropharyngeal exudate or posterior oropharyngeal erythema.       Tonsils: No tonsillar exudate. 2+ on the right. 2+ on the left.   Eyes:      Extraocular Movements: Extraocular movements intact.      Conjunctiva/sclera: Conjunctivae normal.   Cardiovascular:      Rate and Rhythm: Normal rate and regular rhythm.      Pulses: Normal pulses.      Heart sounds: Normal heart sounds. No murmur heard.  Pulmonary:      Effort: Pulmonary effort is normal.      Breath sounds: Normal breath sounds.   Abdominal:      General: Abdomen is flat. Bowel sounds are normal.      Palpations: Abdomen is soft.   Musculoskeletal:      Cervical back: Neck supple.   Feet:      Right foot:      Skin integrity: Skin integrity normal. No ulcer, blister, skin breakdown, erythema, warmth or callus.      Toenail Condition: Right toenails are normal.      Left foot:      Skin integrity: Skin integrity normal. No ulcer, blister, skin breakdown, erythema, warmth or callus.      Toenail Condition: Left toenails are normal.   Lymphadenopathy:      Cervical: No cervical adenopathy.   Skin:     General: Skin is warm and dry.      Capillary Refill: Capillary refill takes more than 3 seconds.      Findings: No rash.   Neurological:      Mental Status: He is alert. Mental status is at baseline.      Sensory: Sensation is intact.   Psychiatric:         Mood and Affect: Mood normal.         Behavior: Behavior normal.         Thought Content: Thought content normal.         Judgment: Judgment normal.   Lifestyle Recommendations  I recommend a whole-food plant-based diet, an eating pattern that encourages the consumption of unrefined plant foods (such as fruits, vegetables, tubers, whole grains, legumes, nuts and seeds) and discourages meats, dairy products, eggs and processed foods.     The AHA/ACC recommends that the patient consume a dietary pattern that emphasizes intake of vegetables, fruits, and whole grains; includes low-fat dairy products, poultry, fish, legumes, non-tropical vegetable oils, and nuts; and limits  intake of sodium, sweets, sugar-sweetened beverages, and red meats.  Adapt this dietary pattern to appropriate calorie requirements (a 500-750 kcal/day deficit to loose weight), personal and cultural food preferences, and nutrition therapy for other medical conditions (including diabetes).  Achieve this pattern by following plans such as the [place] Mediterranean, DASH dietary pattern, or AHA diet.     Engage in 2 hours and 30 minutes per week of moderate-intensity physical activity, or 1 hour and 15 minutes (75 minutes) per week of vigorous-intensity aerobic physical activity, or an equivalent combination of moderate and vigorous-intensity aerobic physical activity. Aerobic activity should be performed in episodes of at least 10 minutes preferably spread throughout the week.     Adhering to a heart healthy diet, regular exercise habits, avoidance of tobacco products, and maintenance of a healthy weight are crucial components of their heart disease risk reduction.  This may not meet the criteria for a clinical depression diagnosis. Symptoms were reviewed with Joey.  Follow-up within the next 3 months is recommended to re-assess symptoms and monitor mental health status.     ABNORMAL RIGHT diabetic foot exam: RIGHT pulses are palpable, no ulcers, callous, or lesions present on the RIGHT, and sensation is DECREASED on the RIGHT.   ABNORMAL LEFT diabetic foot exam: LEFT pulses are palpable, no ulcers, callous, or lesions present on the LEFT, and sensation is DECREASED on the LEFT.     Assessment & Plan  Type 2 diabetes mellitus with hyperglycemia, without long-term current use of insulin  Concerning symptoms for polydipsia polyphagia polyuria with uncontrolled blood sugars with resistance to medication therapies refuses insulin therapy reevaluate blood work and reevaluate options for control and assess severity given recent weight loss of 12 pounds  Orders:  •  Follow Up In [facility] - PCP - Established  •   Comprehensive Metabolic Panel; Future  •  Hemoglobin A1C; Future  •  Lipid Panel; Future  •  Albumin-Creatinine Ratio, Urine Random; Future  •  CBC and Auto Differential; Future    Dyslipidemia associated with type 2 diabetes mellitus  Statin allergic reevaluate blood work to look at nonstatin alternatives for LDL goal less than 70  Orders:  •  Follow Up In [facility] - PCP - Established  •  Comprehensive Metabolic Panel; Future  •  Hemoglobin A1C; Future  •  Lipid Panel; Future  •  Albumin-Creatinine Ratio, Urine Random; Future  •  CBC and Auto Differential; Future    Former smoker  Order lung cancer screening quit 10 years ago 40-year pack history at least 6 denies symptoms  Orders:  •  CT lung screening low dose; Future    Elevated prostate specific antigen (PSA)  Reevaluate PSA with elevated PSA levels  Orders:  •  PSA; Future    Class 1 obesity due to excess calories with serious comorbidity and body mass index (BMI) of 33.0 to 33.9 in adult  The patient received Current weight: 105 kg (232 lb)  Weight change since last visit (-) denotes wt loss -11 lbs   Weight loss needed to achieve BMI 25: 58.1 Lbs  Weight loss needed to achieve BMI 30: 23.4 Lbs    Provided instructions on dietary changes  Provided instructions on exercise  Advised to Increase physical activity because they have an above normal BMI.        Adenoma of right adrenal gland  Stable unchanged 1.7 cm reevaluate with lung cancer screening) continued improvement will reevaluate blood work first and decide on a safe regimen patient resistant to medications for optimal blood pressure control       Paroxysmal atrial fibrillation (Multi)  Refuses use of oral anticoagulation continue on aspirin 81 mg daily stable on bisoprolol 10 mg daily with nifedipine 30 mg twice a day evaluate patient for ARB       Opioid dependence with current use (Multi)  Continue to monitor with pain management specialist on controlled release morphine 15 mg twice a  day with oxycodone for breakthrough                   Hugo Garzon, DO 06/30/25 6:23 PM

## 2025-06-30 NOTE — ASSESSMENT & PLAN NOTE
The patient received Current weight: 105 kg (232 lb)  Weight change since last visit (-) denotes wt loss -11 lbs   Weight loss needed to achieve BMI 25: 58.1 Lbs  Weight loss needed to achieve BMI 30: 23.4 Lbs    Provided instructions on dietary changes  Provided instructions on exercise  Advised to Increase physical activity because they have an above normal BMI.

## 2025-06-30 NOTE — ASSESSMENT & PLAN NOTE
Refuses use of oral anticoagulation continue on aspirin 81 mg daily stable on bisoprolol 10 mg daily with nifedipine 30 mg twice a day evaluate patient for ARB

## 2025-06-30 NOTE — ASSESSMENT & PLAN NOTE
Stable unchanged 1.7 cm reevaluate with lung cancer screening) continued improvement will reevaluate blood work first and decide on a safe regimen patient resistant to medications for optimal blood pressure control

## 2025-06-30 NOTE — ASSESSMENT & PLAN NOTE
Statin allergic reevaluate blood work to look at nonstatin alternatives for LDL goal less than 70  Orders:    Follow Up In [facility] - PCP - Established    Comprehensive Metabolic Panel; Future    Hemoglobin A1C; Future    Lipid Panel; Future    Albumin-Creatinine Ratio, Urine Random; Future    CBC and Auto Differential; Future

## 2025-07-29 ENCOUNTER — TELEPHONE (OUTPATIENT)
Dept: CARDIOLOGY | Facility: HOSPITAL | Age: 60
End: 2025-07-29
Payer: MEDICAID

## 2025-07-31 ENCOUNTER — APPOINTMENT (OUTPATIENT)
Dept: CARDIOLOGY | Facility: HOSPITAL | Age: 60
End: 2025-07-31
Payer: MEDICAID

## 2025-08-08 ENCOUNTER — HOSPITAL ENCOUNTER (OUTPATIENT)
Dept: RADIOLOGY | Facility: HOSPITAL | Age: 60
Discharge: HOME | End: 2025-08-08
Payer: MEDICAID

## 2025-08-08 DIAGNOSIS — Z87.891 FORMER SMOKER: ICD-10-CM

## 2025-08-08 PROCEDURE — 71271 CT THORAX LUNG CANCER SCR C-: CPT

## 2025-08-11 ENCOUNTER — TELEPHONE (OUTPATIENT)
Dept: PRIMARY CARE | Facility: CLINIC | Age: 60
End: 2025-08-11
Payer: MEDICAID

## 2025-08-11 DIAGNOSIS — I10 BENIGN ESSENTIAL HYPERTENSION: ICD-10-CM

## 2025-08-11 RX ORDER — BISOPROLOL FUMARATE 10 MG/1
10 TABLET, FILM COATED ORAL DAILY
Qty: 90 TABLET | Refills: 3 | Status: SHIPPED | OUTPATIENT
Start: 2025-08-11

## 2025-08-11 RX ORDER — NIFEDIPINE 30 MG/1
30 TABLET, FILM COATED, EXTENDED RELEASE ORAL 2 TIMES DAILY
Qty: 180 TABLET | Refills: 3 | Status: SHIPPED | OUTPATIENT
Start: 2025-08-11 | End: 2026-08-11

## 2025-08-15 ENCOUNTER — TELEPHONE (OUTPATIENT)
Dept: PRIMARY CARE | Facility: CLINIC | Age: 60
End: 2025-08-15
Payer: MEDICAID

## 2025-08-20 ENCOUNTER — APPOINTMENT (OUTPATIENT)
Dept: CARDIOLOGY | Facility: HOSPITAL | Age: 60
End: 2025-08-20
Payer: MEDICAID

## 2025-10-01 ENCOUNTER — APPOINTMENT (OUTPATIENT)
Dept: PRIMARY CARE | Facility: CLINIC | Age: 60
End: 2025-10-01
Payer: MEDICAID